# Patient Record
Sex: FEMALE | Race: BLACK OR AFRICAN AMERICAN | NOT HISPANIC OR LATINO | Employment: FULL TIME | URBAN - METROPOLITAN AREA
[De-identification: names, ages, dates, MRNs, and addresses within clinical notes are randomized per-mention and may not be internally consistent; named-entity substitution may affect disease eponyms.]

---

## 2021-05-26 PROBLEM — E66.01 MORBID OBESITY WITH BMI OF 70 AND OVER, ADULT: Status: ACTIVE | Noted: 2021-05-26

## 2021-05-26 PROBLEM — I10 ESSENTIAL HYPERTENSION: Status: ACTIVE | Noted: 2021-05-26

## 2021-05-31 ENCOUNTER — TELEPHONE (OUTPATIENT)
Dept: BARIATRICS | Facility: CLINIC | Age: 26
End: 2021-05-31

## 2021-06-23 ENCOUNTER — OFFICE VISIT (OUTPATIENT)
Dept: OBSTETRICS AND GYNECOLOGY | Facility: CLINIC | Age: 26
End: 2021-06-23
Payer: COMMERCIAL

## 2021-06-23 VITALS
HEIGHT: 68 IN | DIASTOLIC BLOOD PRESSURE: 82 MMHG | BODY MASS INDEX: 44.41 KG/M2 | SYSTOLIC BLOOD PRESSURE: 124 MMHG | WEIGHT: 293 LBS

## 2021-06-23 DIAGNOSIS — N92.6 IRREGULAR PERIODS: ICD-10-CM

## 2021-06-23 DIAGNOSIS — I10 ESSENTIAL HYPERTENSION: ICD-10-CM

## 2021-06-23 DIAGNOSIS — E66.01 MORBID OBESITY WITH BMI OF 70 AND OVER, ADULT: ICD-10-CM

## 2021-06-23 DIAGNOSIS — Z01.419 ROUTINE GYNECOLOGICAL EXAMINATION: Primary | ICD-10-CM

## 2021-06-23 PROCEDURE — 3008F PR BODY MASS INDEX (BMI) DOCUMENTED: ICD-10-PCS | Mod: CPTII,S$GLB,, | Performed by: OBSTETRICS & GYNECOLOGY

## 2021-06-23 PROCEDURE — 99385 PR PREVENTIVE VISIT,NEW,18-39: ICD-10-PCS | Mod: S$GLB,,, | Performed by: OBSTETRICS & GYNECOLOGY

## 2021-06-23 PROCEDURE — 1126F PR PAIN SEVERITY QUANTIFIED, NO PAIN PRESENT: ICD-10-PCS | Mod: S$GLB,,, | Performed by: OBSTETRICS & GYNECOLOGY

## 2021-06-23 PROCEDURE — 3074F SYST BP LT 130 MM HG: CPT | Mod: CPTII,S$GLB,, | Performed by: OBSTETRICS & GYNECOLOGY

## 2021-06-23 PROCEDURE — 99385 PREV VISIT NEW AGE 18-39: CPT | Mod: S$GLB,,, | Performed by: OBSTETRICS & GYNECOLOGY

## 2021-06-23 PROCEDURE — 1126F AMNT PAIN NOTED NONE PRSNT: CPT | Mod: S$GLB,,, | Performed by: OBSTETRICS & GYNECOLOGY

## 2021-06-23 PROCEDURE — 3008F BODY MASS INDEX DOCD: CPT | Mod: CPTII,S$GLB,, | Performed by: OBSTETRICS & GYNECOLOGY

## 2021-06-23 PROCEDURE — 3074F PR MOST RECENT SYSTOLIC BLOOD PRESSURE < 130 MM HG: ICD-10-PCS | Mod: CPTII,S$GLB,, | Performed by: OBSTETRICS & GYNECOLOGY

## 2021-06-23 PROCEDURE — 99999 PR PBB SHADOW E&M-EST. PATIENT-LVL III: CPT | Mod: PBBFAC,,, | Performed by: OBSTETRICS & GYNECOLOGY

## 2021-06-23 PROCEDURE — 87624 HPV HI-RISK TYP POOLED RSLT: CPT | Performed by: OBSTETRICS & GYNECOLOGY

## 2021-06-23 PROCEDURE — 88175 CYTOPATH C/V AUTO FLUID REDO: CPT | Performed by: OBSTETRICS & GYNECOLOGY

## 2021-06-23 PROCEDURE — 3079F PR MOST RECENT DIASTOLIC BLOOD PRESSURE 80-89 MM HG: ICD-10-PCS | Mod: CPTII,S$GLB,, | Performed by: OBSTETRICS & GYNECOLOGY

## 2021-06-23 PROCEDURE — 3079F DIAST BP 80-89 MM HG: CPT | Mod: CPTII,S$GLB,, | Performed by: OBSTETRICS & GYNECOLOGY

## 2021-06-23 PROCEDURE — 99999 PR PBB SHADOW E&M-EST. PATIENT-LVL III: ICD-10-PCS | Mod: PBBFAC,,, | Performed by: OBSTETRICS & GYNECOLOGY

## 2021-06-29 LAB
HPV HR 12 DNA SPEC QL NAA+PROBE: NEGATIVE
HPV16 AG SPEC QL: NEGATIVE
HPV18 DNA SPEC QL NAA+PROBE: NEGATIVE

## 2021-06-30 LAB
FINAL PATHOLOGIC DIAGNOSIS: NORMAL
Lab: NORMAL

## 2021-07-30 ENCOUNTER — IMMUNIZATION (OUTPATIENT)
Dept: FAMILY MEDICINE | Facility: CLINIC | Age: 26
End: 2021-07-30
Payer: MEDICAID

## 2021-07-30 DIAGNOSIS — Z23 NEED FOR VACCINATION: Primary | ICD-10-CM

## 2021-07-30 PROCEDURE — 91300 COVID-19, MRNA, LNP-S, PF, 30 MCG/0.3 ML DOSE VACCINE: CPT | Mod: PBBFAC,PO

## 2021-08-23 ENCOUNTER — IMMUNIZATION (OUTPATIENT)
Dept: FAMILY MEDICINE | Facility: CLINIC | Age: 26
End: 2021-08-23
Payer: MEDICAID

## 2021-08-23 DIAGNOSIS — Z23 NEED FOR VACCINATION: Primary | ICD-10-CM

## 2021-08-23 PROCEDURE — 0002A COVID-19, MRNA, LNP-S, PF, 30 MCG/0.3 ML DOSE VACCINE: ICD-10-PCS | Mod: CV19,,, | Performed by: FAMILY MEDICINE

## 2021-08-23 PROCEDURE — 0002A COVID-19, MRNA, LNP-S, PF, 30 MCG/0.3 ML DOSE VACCINE: CPT | Mod: CV19,,, | Performed by: FAMILY MEDICINE

## 2021-08-23 PROCEDURE — 91300 COVID-19, MRNA, LNP-S, PF, 30 MCG/0.3 ML DOSE VACCINE: ICD-10-PCS | Mod: ,,, | Performed by: FAMILY MEDICINE

## 2021-08-23 PROCEDURE — 91300 COVID-19, MRNA, LNP-S, PF, 30 MCG/0.3 ML DOSE VACCINE: CPT | Mod: ,,, | Performed by: FAMILY MEDICINE

## 2022-01-24 ENCOUNTER — OFFICE VISIT (OUTPATIENT)
Dept: OBSTETRICS AND GYNECOLOGY | Facility: CLINIC | Age: 27
End: 2022-01-24
Payer: COMMERCIAL

## 2022-01-24 VITALS
HEIGHT: 68 IN | BODY MASS INDEX: 44.41 KG/M2 | DIASTOLIC BLOOD PRESSURE: 82 MMHG | WEIGHT: 293 LBS | SYSTOLIC BLOOD PRESSURE: 138 MMHG

## 2022-01-24 DIAGNOSIS — I10 ESSENTIAL HYPERTENSION: ICD-10-CM

## 2022-01-24 DIAGNOSIS — E66.01 MORBID OBESITY WITH BMI OF 70 AND OVER, ADULT: ICD-10-CM

## 2022-01-24 DIAGNOSIS — L68.9 EXCESSIVE HAIR GROWTH: ICD-10-CM

## 2022-01-24 DIAGNOSIS — N92.6 IRREGULAR PERIODS: Primary | ICD-10-CM

## 2022-01-24 PROCEDURE — 99999 PR PBB SHADOW E&M-EST. PATIENT-LVL III: CPT | Mod: PBBFAC,,, | Performed by: OBSTETRICS & GYNECOLOGY

## 2022-01-24 PROCEDURE — 3008F PR BODY MASS INDEX (BMI) DOCUMENTED: ICD-10-PCS | Mod: CPTII,,, | Performed by: OBSTETRICS & GYNECOLOGY

## 2022-01-24 PROCEDURE — 1159F MED LIST DOCD IN RCRD: CPT | Mod: CPTII,,, | Performed by: OBSTETRICS & GYNECOLOGY

## 2022-01-24 PROCEDURE — 3075F SYST BP GE 130 - 139MM HG: CPT | Mod: CPTII,,, | Performed by: OBSTETRICS & GYNECOLOGY

## 2022-01-24 PROCEDURE — 99213 OFFICE O/P EST LOW 20 MIN: CPT | Mod: PBBFAC,PN | Performed by: OBSTETRICS & GYNECOLOGY

## 2022-01-24 PROCEDURE — 3075F PR MOST RECENT SYSTOLIC BLOOD PRESS GE 130-139MM HG: ICD-10-PCS | Mod: CPTII,,, | Performed by: OBSTETRICS & GYNECOLOGY

## 2022-01-24 PROCEDURE — 3079F DIAST BP 80-89 MM HG: CPT | Mod: CPTII,,, | Performed by: OBSTETRICS & GYNECOLOGY

## 2022-01-24 PROCEDURE — 99214 OFFICE O/P EST MOD 30 MIN: CPT | Mod: S$PBB,,, | Performed by: OBSTETRICS & GYNECOLOGY

## 2022-01-24 PROCEDURE — 99214 PR OFFICE/OUTPT VISIT, EST, LEVL IV, 30-39 MIN: ICD-10-PCS | Mod: S$PBB,,, | Performed by: OBSTETRICS & GYNECOLOGY

## 2022-01-24 PROCEDURE — 99999 PR PBB SHADOW E&M-EST. PATIENT-LVL III: ICD-10-PCS | Mod: PBBFAC,,, | Performed by: OBSTETRICS & GYNECOLOGY

## 2022-01-24 PROCEDURE — 3079F PR MOST RECENT DIASTOLIC BLOOD PRESSURE 80-89 MM HG: ICD-10-PCS | Mod: CPTII,,, | Performed by: OBSTETRICS & GYNECOLOGY

## 2022-01-24 PROCEDURE — 3008F BODY MASS INDEX DOCD: CPT | Mod: CPTII,,, | Performed by: OBSTETRICS & GYNECOLOGY

## 2022-01-24 PROCEDURE — 1159F PR MEDICATION LIST DOCUMENTED IN MEDICAL RECORD: ICD-10-PCS | Mod: CPTII,,, | Performed by: OBSTETRICS & GYNECOLOGY

## 2022-01-24 RX ORDER — SPIRONOLACTONE 25 MG/1
25 TABLET ORAL DAILY
Qty: 30 TABLET | Refills: 11 | Status: SHIPPED | OUTPATIENT
Start: 2022-01-24 | End: 2022-12-02 | Stop reason: SDUPTHER

## 2022-01-24 NOTE — PROGRESS NOTES
Chief Complaint   Patient presents with    Follow-up     Birth control follow up       History of Present Illness: Juanita Del Castillo is a 26 y.o. female that presents today 2022 for   Chief Complaint   Patient presents with    Follow-up     Birth control follow up     Reports 10 lbs weight loss, bothered more by her hair growth on her chin,  Have regular cycles on pills.  BP controlled now on 2 BP meds.     Past Medical History:   Diagnosis Date    HTN (hypertension)        History reviewed. No pertinent surgical history.    Current Outpatient Medications   Medication Sig Dispense Refill    ascorbic acid, vitamin C, (VITAMIN C) 1000 MG tablet Take 1,000 mg by mouth once daily.      b complex vitamins capsule Take 1 capsule by mouth once daily.      hydroCHLOROthiazide (HYDRODIURIL) 25 MG tablet Take 1 tablet (25 mg total) by mouth once daily. 90 tablet 1    losartan (COZAAR) 50 MG tablet Take 1 tablet (50 mg total) by mouth once daily. 90 tablet 1    norgestrel-ethinyl estradioL (LO/OVRAL) 0.3-30 mg-mcg per tablet Take 1 tablet by mouth once daily. 28 tablet 11    spironolactone (ALDACTONE) 25 MG tablet Take 1 tablet (25 mg total) by mouth once daily. 30 tablet 11    triamcinolone acetonide 0.1% (KENALOG) 0.1 % cream Apply topically 2 (two) times daily. 45 g 0     No current facility-administered medications for this visit.       Review of patient's allergies indicates:   Allergen Reactions    Lisinopril      Lip swelling       Family History   Problem Relation Age of Onset    Hypertension Mother     Breast cancer Paternal Grandmother     Ovarian cancer Neg Hx        Social History     Tobacco Use    Smoking status: Never Smoker    Smokeless tobacco: Never Used   Substance Use Topics    Alcohol use: Yes     Comment: once every 3 months    Drug use: Never       OB History    Para Term  AB Living   0 0 0 0 0 0   SAB IAB Ectopic Multiple Live Births   0 0 0 0 0         /82    "Ht 5' 8" (1.727 m)   Wt (!) 222 kg (489 lb 6.7 oz)   LMP 01/18/2022 (Exact Date)       ASSESSMENT/PLAN:  Irregular periods  Comments:  improved on OCPs  recommended mirena in light of her CHTN and obesity  Orders:  -     Device Authorization Order  -     norgestrel-ethinyl estradioL (LO/OVRAL) 0.3-30 mg-mcg per tablet; Take 1 tablet by mouth once daily.  Dispense: 28 tablet; Refill: 11    Essential hypertension    Morbid obesity with BMI of 70 and over, adult    Excessive hair growth  -     spironolactone (ALDACTONE) 25 MG tablet; Take 1 tablet (25 mg total) by mouth once daily.  Dispense: 30 tablet; Refill: 11      We discussed her weight and she says she is working on it.     Pap due 6/22    30 minutes spent today spent preparing reviewing previous external notes, reviewing previous results, performing medical examination, ordering tests or medications, counseling and documenting.     "

## 2022-04-19 ENCOUNTER — PATIENT MESSAGE (OUTPATIENT)
Dept: OBSTETRICS AND GYNECOLOGY | Facility: CLINIC | Age: 27
End: 2022-04-19
Payer: COMMERCIAL

## 2022-04-26 ENCOUNTER — OFFICE VISIT (OUTPATIENT)
Dept: OBSTETRICS AND GYNECOLOGY | Facility: CLINIC | Age: 27
End: 2022-04-26
Payer: COMMERCIAL

## 2022-04-26 VITALS — BODY MASS INDEX: 44.41 KG/M2 | WEIGHT: 293 LBS | HEIGHT: 68 IN

## 2022-04-26 DIAGNOSIS — N92.6 IRREGULAR PERIODS: ICD-10-CM

## 2022-04-26 DIAGNOSIS — Z30.430 ENCOUNTER FOR IUD INSERTION: Primary | ICD-10-CM

## 2022-04-26 PROCEDURE — 1159F MED LIST DOCD IN RCRD: CPT | Mod: CPTII,S$GLB,, | Performed by: OBSTETRICS & GYNECOLOGY

## 2022-04-26 PROCEDURE — 87591 N.GONORRHOEAE DNA AMP PROB: CPT | Performed by: OBSTETRICS & GYNECOLOGY

## 2022-04-26 PROCEDURE — 99999 PR PBB SHADOW E&M-EST. PATIENT-LVL III: CPT | Mod: PBBFAC,,, | Performed by: OBSTETRICS & GYNECOLOGY

## 2022-04-26 PROCEDURE — 88305 TISSUE EXAM BY PATHOLOGIST: CPT | Performed by: PATHOLOGY

## 2022-04-26 PROCEDURE — 58100 BIOPSY OF UTERUS LINING: CPT | Mod: PBBFAC,PN | Performed by: OBSTETRICS & GYNECOLOGY

## 2022-04-26 PROCEDURE — 88305 TISSUE EXAM BY PATHOLOGIST: CPT | Mod: 26,,, | Performed by: PATHOLOGY

## 2022-04-26 PROCEDURE — 58300 PR INSERT INTRAUTERINE DEVICE: ICD-10-PCS | Mod: S$GLB,,, | Performed by: OBSTETRICS & GYNECOLOGY

## 2022-04-26 PROCEDURE — 58300 INSERT INTRAUTERINE DEVICE: CPT | Mod: PBBFAC,PN | Performed by: OBSTETRICS & GYNECOLOGY

## 2022-04-26 PROCEDURE — 99499 UNLISTED E&M SERVICE: CPT | Mod: S$GLB,,, | Performed by: OBSTETRICS & GYNECOLOGY

## 2022-04-26 PROCEDURE — 99999 PR PBB SHADOW E&M-EST. PATIENT-LVL III: ICD-10-PCS | Mod: PBBFAC,,, | Performed by: OBSTETRICS & GYNECOLOGY

## 2022-04-26 PROCEDURE — 1159F PR MEDICATION LIST DOCUMENTED IN MEDICAL RECORD: ICD-10-PCS | Mod: CPTII,S$GLB,, | Performed by: OBSTETRICS & GYNECOLOGY

## 2022-04-26 PROCEDURE — 58100 PR BIOPSY OF UTERUS LINING: ICD-10-PCS | Mod: S$GLB,,, | Performed by: OBSTETRICS & GYNECOLOGY

## 2022-04-26 PROCEDURE — 58100 BIOPSY OF UTERUS LINING: CPT | Mod: S$GLB,,, | Performed by: OBSTETRICS & GYNECOLOGY

## 2022-04-26 PROCEDURE — 87491 CHLMYD TRACH DNA AMP PROBE: CPT | Performed by: OBSTETRICS & GYNECOLOGY

## 2022-04-26 PROCEDURE — 3008F PR BODY MASS INDEX (BMI) DOCUMENTED: ICD-10-PCS | Mod: CPTII,S$GLB,, | Performed by: OBSTETRICS & GYNECOLOGY

## 2022-04-26 PROCEDURE — 58300 INSERT INTRAUTERINE DEVICE: CPT | Mod: S$GLB,,, | Performed by: OBSTETRICS & GYNECOLOGY

## 2022-04-26 PROCEDURE — 88305 TISSUE EXAM BY PATHOLOGIST: ICD-10-PCS | Mod: 26,,, | Performed by: PATHOLOGY

## 2022-04-26 PROCEDURE — 99213 OFFICE O/P EST LOW 20 MIN: CPT | Mod: PBBFAC,PN,25 | Performed by: OBSTETRICS & GYNECOLOGY

## 2022-04-26 PROCEDURE — 99499 NO LOS: ICD-10-PCS | Mod: S$GLB,,, | Performed by: OBSTETRICS & GYNECOLOGY

## 2022-04-26 PROCEDURE — 3008F BODY MASS INDEX DOCD: CPT | Mod: CPTII,S$GLB,, | Performed by: OBSTETRICS & GYNECOLOGY

## 2022-04-26 RX ADMIN — LEVONORGESTREL 1 INTRA UTERINE DEVICE: 52 INTRAUTERINE DEVICE INTRAUTERINE at 03:04

## 2022-04-26 NOTE — PROGRESS NOTES
ENDOMETRIAL BIOPSY:    Female patient presents for an endometrial biopsy due to abnormal bleeding.      UPT is negative.    PRE ENDOMETRIAL BIOPSY COUNSELING:  The patient was informed of the risk of bleeding, infection, uterine perforation and pain and that the test will rule-out endometrial cancer with accuracy greater than 95%. She was counseled on the alternatives to endometrial biopsy and agrees to proceed.    PROCEDURE:  TIME OUT PERFORMED.  The cervix was visualized with a speculum.  A single tooth tenaculum was placed on the anterior lip prior to the biopsy.  A sterile endometrial pipelle was passed without difficulty to a depth of 8 cm.  Adequate endometrial tissue was obtained.    The specimen was placed in formalyn and sent to Pathology for histology evaluation.  The patient tolerated the procedure well.    ASSESSMENT:   Abnormal uterine bleeding  626.8.    POST ENDOMETRIAL BIOPSY COUNSELING:  Manage post biopsy cramping with NSAIDs or Tylenol.  Expect spotting or light bleeding for a few days.  Report bleeding heavier than a period, fever > 101.0 F, worsening pain or a foul smelling vaginal discharge.    Counseling lasted approximately 15 minutes and all her questions were answered.    FOLLOW-UP: Pending biopsy results.    TIMEOUT PERFORMED  Patient identified, device confirmed, and allergies reviewed.     IUD PLACEMENT:    Female patient  presents for an IUD placement.    negative UPT    PRE-IUD PLACEMENT COUNSELING:  All contraceptive options were reviewed and the patient chooses an IUD.  The patient's history was reviewed and there are no contraindications to an IUD. The procedure and minimal risks of pain, bleeding, perforation and infection at the insertion and spontaneous expulsion within the first two weeks was discussed. The benefits of amenorrhea and no systemic side effects were explained. All questions were answered and the patient agrees to proceed. Consent was signed (scanned into  computer).    EXAM:  Uterine Position: AV    PROCEDURE:  TIME OUT PERFORMED.  The cervix visualized with a speculum.  A single tooth tenaculum placed on the anterior lip.  The uterus sounded to 10 cm using sterile technique.    The Mirena     IUD was loaded and placed high in uterine fundus without difficulty using sterile technique.  The string was cut to 2cm length from exo cervix.  The tenaculum and speculum were removed. The patient tolerated the procedure well.    ASSESSMENT:  1. Contraception management / IUD insertion.V25.0.    POST IUD PLACEMENT COUNSELING:  Manage post IUD placement pain with NSAIDs, Tylenol or Rx per [employer].  IUD danger signs and how to check the strings.  Removal in 5 years for Mirena IUD and in 10 years for Copper IUD.    Counseling lasted approximately 15 minutes and all her questions were answered.    FOLLOW-UP: With me in 6 weeks.

## 2022-04-27 LAB
C TRACH DNA SPEC QL NAA+PROBE: NOT DETECTED
N GONORRHOEA DNA SPEC QL NAA+PROBE: NOT DETECTED

## 2022-05-03 LAB
FINAL PATHOLOGIC DIAGNOSIS: NORMAL
Lab: NORMAL

## 2022-05-30 ENCOUNTER — PATIENT MESSAGE (OUTPATIENT)
Dept: OBSTETRICS AND GYNECOLOGY | Facility: CLINIC | Age: 27
End: 2022-05-30
Payer: COMMERCIAL

## 2022-07-01 ENCOUNTER — OFFICE VISIT (OUTPATIENT)
Dept: OBSTETRICS AND GYNECOLOGY | Facility: CLINIC | Age: 27
End: 2022-07-01
Payer: COMMERCIAL

## 2022-07-01 VITALS
WEIGHT: 293 LBS | HEIGHT: 68 IN | SYSTOLIC BLOOD PRESSURE: 130 MMHG | BODY MASS INDEX: 44.41 KG/M2 | DIASTOLIC BLOOD PRESSURE: 82 MMHG

## 2022-07-01 DIAGNOSIS — Z30.431 IUD CHECK UP: Primary | ICD-10-CM

## 2022-07-01 PROCEDURE — 99999 PR PBB SHADOW E&M-EST. PATIENT-LVL III: CPT | Mod: PBBFAC,,, | Performed by: OBSTETRICS & GYNECOLOGY

## 2022-07-01 PROCEDURE — 3075F SYST BP GE 130 - 139MM HG: CPT | Mod: CPTII,S$GLB,, | Performed by: OBSTETRICS & GYNECOLOGY

## 2022-07-01 PROCEDURE — 1159F MED LIST DOCD IN RCRD: CPT | Mod: CPTII,S$GLB,, | Performed by: OBSTETRICS & GYNECOLOGY

## 2022-07-01 PROCEDURE — 3075F PR MOST RECENT SYSTOLIC BLOOD PRESS GE 130-139MM HG: ICD-10-PCS | Mod: CPTII,S$GLB,, | Performed by: OBSTETRICS & GYNECOLOGY

## 2022-07-01 PROCEDURE — 4010F PR ACE/ARB THEARPY RXD/TAKEN: ICD-10-PCS | Mod: CPTII,S$GLB,, | Performed by: OBSTETRICS & GYNECOLOGY

## 2022-07-01 PROCEDURE — 99999 PR PBB SHADOW E&M-EST. PATIENT-LVL III: ICD-10-PCS | Mod: PBBFAC,,, | Performed by: OBSTETRICS & GYNECOLOGY

## 2022-07-01 PROCEDURE — 3008F PR BODY MASS INDEX (BMI) DOCUMENTED: ICD-10-PCS | Mod: CPTII,S$GLB,, | Performed by: OBSTETRICS & GYNECOLOGY

## 2022-07-01 PROCEDURE — 99499 UNLISTED E&M SERVICE: CPT | Mod: S$GLB,,, | Performed by: OBSTETRICS & GYNECOLOGY

## 2022-07-01 PROCEDURE — 3079F DIAST BP 80-89 MM HG: CPT | Mod: CPTII,S$GLB,, | Performed by: OBSTETRICS & GYNECOLOGY

## 2022-07-01 PROCEDURE — 1159F PR MEDICATION LIST DOCUMENTED IN MEDICAL RECORD: ICD-10-PCS | Mod: CPTII,S$GLB,, | Performed by: OBSTETRICS & GYNECOLOGY

## 2022-07-01 PROCEDURE — 4010F ACE/ARB THERAPY RXD/TAKEN: CPT | Mod: CPTII,S$GLB,, | Performed by: OBSTETRICS & GYNECOLOGY

## 2022-07-01 PROCEDURE — 99499 NO LOS: ICD-10-PCS | Mod: S$GLB,,, | Performed by: OBSTETRICS & GYNECOLOGY

## 2022-07-01 PROCEDURE — 3008F BODY MASS INDEX DOCD: CPT | Mod: CPTII,S$GLB,, | Performed by: OBSTETRICS & GYNECOLOGY

## 2022-07-01 PROCEDURE — 3079F PR MOST RECENT DIASTOLIC BLOOD PRESSURE 80-89 MM HG: ICD-10-PCS | Mod: CPTII,S$GLB,, | Performed by: OBSTETRICS & GYNECOLOGY

## 2022-08-01 ENCOUNTER — PATIENT MESSAGE (OUTPATIENT)
Dept: OBSTETRICS AND GYNECOLOGY | Facility: CLINIC | Age: 27
End: 2022-08-01
Payer: COMMERCIAL

## 2022-08-01 DIAGNOSIS — N92.6 IRREGULAR PERIODS: ICD-10-CM

## 2022-09-19 ENCOUNTER — OFFICE VISIT (OUTPATIENT)
Dept: OBSTETRICS AND GYNECOLOGY | Facility: CLINIC | Age: 27
End: 2022-09-19
Payer: COMMERCIAL

## 2022-09-19 VITALS
DIASTOLIC BLOOD PRESSURE: 84 MMHG | BODY MASS INDEX: 44.41 KG/M2 | SYSTOLIC BLOOD PRESSURE: 124 MMHG | HEIGHT: 68 IN | WEIGHT: 293 LBS

## 2022-09-19 DIAGNOSIS — L29.2 VULVAR ITCHING: ICD-10-CM

## 2022-09-19 DIAGNOSIS — Z01.419 GYNECOLOGIC EXAM NORMAL: Primary | ICD-10-CM

## 2022-09-19 DIAGNOSIS — Z30.431 IUD CHECK UP: ICD-10-CM

## 2022-09-19 PROCEDURE — 99999 PR PBB SHADOW E&M-EST. PATIENT-LVL III: ICD-10-PCS | Mod: PBBFAC,,, | Performed by: OBSTETRICS & GYNECOLOGY

## 2022-09-19 PROCEDURE — 3008F PR BODY MASS INDEX (BMI) DOCUMENTED: ICD-10-PCS | Mod: CPTII,S$GLB,, | Performed by: OBSTETRICS & GYNECOLOGY

## 2022-09-19 PROCEDURE — 3074F PR MOST RECENT SYSTOLIC BLOOD PRESSURE < 130 MM HG: ICD-10-PCS | Mod: CPTII,S$GLB,, | Performed by: OBSTETRICS & GYNECOLOGY

## 2022-09-19 PROCEDURE — 99395 PR PREVENTIVE VISIT,EST,18-39: ICD-10-PCS | Mod: S$GLB,,, | Performed by: OBSTETRICS & GYNECOLOGY

## 2022-09-19 PROCEDURE — 1159F MED LIST DOCD IN RCRD: CPT | Mod: CPTII,S$GLB,, | Performed by: OBSTETRICS & GYNECOLOGY

## 2022-09-19 PROCEDURE — 4010F ACE/ARB THERAPY RXD/TAKEN: CPT | Mod: CPTII,S$GLB,, | Performed by: OBSTETRICS & GYNECOLOGY

## 2022-09-19 PROCEDURE — 3008F BODY MASS INDEX DOCD: CPT | Mod: CPTII,S$GLB,, | Performed by: OBSTETRICS & GYNECOLOGY

## 2022-09-19 PROCEDURE — 1159F PR MEDICATION LIST DOCUMENTED IN MEDICAL RECORD: ICD-10-PCS | Mod: CPTII,S$GLB,, | Performed by: OBSTETRICS & GYNECOLOGY

## 2022-09-19 PROCEDURE — 3074F SYST BP LT 130 MM HG: CPT | Mod: CPTII,S$GLB,, | Performed by: OBSTETRICS & GYNECOLOGY

## 2022-09-19 PROCEDURE — 99999 PR PBB SHADOW E&M-EST. PATIENT-LVL III: CPT | Mod: PBBFAC,,, | Performed by: OBSTETRICS & GYNECOLOGY

## 2022-09-19 PROCEDURE — 4010F PR ACE/ARB THEARPY RXD/TAKEN: ICD-10-PCS | Mod: CPTII,S$GLB,, | Performed by: OBSTETRICS & GYNECOLOGY

## 2022-09-19 PROCEDURE — 3079F DIAST BP 80-89 MM HG: CPT | Mod: CPTII,S$GLB,, | Performed by: OBSTETRICS & GYNECOLOGY

## 2022-09-19 PROCEDURE — 88175 CYTOPATH C/V AUTO FLUID REDO: CPT | Performed by: OBSTETRICS & GYNECOLOGY

## 2022-09-19 PROCEDURE — 99395 PREV VISIT EST AGE 18-39: CPT | Mod: S$GLB,,, | Performed by: OBSTETRICS & GYNECOLOGY

## 2022-09-19 PROCEDURE — 3079F PR MOST RECENT DIASTOLIC BLOOD PRESSURE 80-89 MM HG: ICD-10-PCS | Mod: CPTII,S$GLB,, | Performed by: OBSTETRICS & GYNECOLOGY

## 2022-09-19 RX ORDER — NYSTATIN AND TRIAMCINOLONE ACETONIDE 100000; 1 [USP'U]/G; MG/G
CREAM TOPICAL
Qty: 30 G | Refills: 1 | Status: SHIPPED | OUTPATIENT
Start: 2022-09-19 | End: 2023-09-14

## 2022-09-19 NOTE — PROGRESS NOTES
Chief Complaint   Patient presents with    Well Woman       History of Present Illness: Juanita Del Castillo is a 26 y.o. female that presents today 9/19/2022 for well gyn visit.    Past Medical History:   Diagnosis Date    HTN (hypertension)        No past surgical history on file.    Current Outpatient Medications   Medication Sig Dispense Refill    ascorbic acid, vitamin C, (VITAMIN C) 1000 MG tablet Take 1,000 mg by mouth once daily.      b complex vitamins capsule Take 1 capsule by mouth once daily.      hydroCHLOROthiazide (HYDRODIURIL) 25 MG tablet Take 1 tablet (25 mg total) by mouth once daily. Needs office visit for further refills 30 tablet 0    losartan (COZAAR) 50 MG tablet Take 1 tablet (50 mg total) by mouth once daily. Needs office visit for further refills 30 tablet 0    triamcinolone acetonide 0.1% (KENALOG) 0.1 % cream Apply topically 2 (two) times daily. 45 g 0    albuterol (PROAIR HFA) 90 mcg/actuation inhaler Inhale 2 puffs into the lungs every 6 (six) hours as needed for Shortness of Breath. Rescue (Patient not taking: Reported on 9/19/2022) 18 g 0    benzonatate (TESSALON PERLES) 100 MG capsule Take 1 capsule (100 mg total) by mouth 3 (three) times daily as needed for Cough. 30 capsule 1    norgestrel-ethinyl estradioL (LO/OVRAL) 0.3-30 mg-mcg per tablet Take 1 tablet by mouth once daily. 28 tablet 11    nystatin-triamcinolone (MYCOLOG II) cream Apply to affected area 2 times daily 30 g 1    promethazine (PHENERGAN) 6.25 mg/5 mL syrup Take 20 mLs (25 mg total) by mouth nightly as needed for Nausea. 118 mL 0    spironolactone (ALDACTONE) 25 MG tablet Take 1 tablet (25 mg total) by mouth once daily. 30 tablet 11     No current facility-administered medications for this visit.       Review of patient's allergies indicates:   Allergen Reactions    Lisinopril      Lip swelling       Family History   Problem Relation Age of Onset    Hypertension Mother     Breast cancer Paternal Grandmother      "Ovarian cancer Neg Hx        Social History     Socioeconomic History    Marital status: Single   Tobacco Use    Smoking status: Never    Smokeless tobacco: Never   Substance and Sexual Activity    Alcohol use: Yes     Comment: once every 3 months    Drug use: Never    Sexual activity: Not Currently     Partners: Male       OB History    Para Term  AB Living   0 0 0 0 0 0   SAB IAB Ectopic Multiple Live Births   0 0 0 0 0       Review of Symptoms:  GENERAL: Denies weight gain or weight loss. Feeling well overall.   SKIN: Denies rash or lesions.   HEAD: Denies head injury or headache.   NODES: Denies enlarged lymph nodes.   CHEST: Denies chest pain or shortness of breath.   CARDIOVASCULAR: Denies palpitations or left sided chest pain.   ABDOMEN: No abdominal pain, constipation, diarrhea, nausea, vomiting or rectal bleeding.   URINARY: No frequency, dysuria, hematuria, or burning on urination.  HEMATOLOGIC: No easy bruisability or excessive bleeding.   MUSCULOSKELETAL: Denies joint pain or swelling.     /84   Ht 5' 8" (1.727 m)   Wt (!) 214.6 kg (473 lb 1.7 oz)   LMP 2022   Physical Exam:  APPEARANCE: Well nourished, well developed, in no acute distress.  SKIN: Normal skin turgor, no lesions.  NECK: Neck symmetric without masses   RESPIRATORY: Normal respiratory effort with no retractions or use of accessory muscles  CARDIOVASCULAR: Peripheral vascular system with no swelling no varicosities and palpation of pulses normal  LYMPHATIC: No enlargements of the lymph nodes noted in the neck, axillae, or groin  ABDOMEN: Soft. No tenderness or masses. No hepatosplenomegaly. No hernias.  BREASTS: Symmetrical, no skin changes or visible lesions. No palpable masses, nipple discharge or adenopathy bilaterally.  PELVIC: Normal external female genitalia without lesions. Normal hair distribution. Adequate perineal body, normal urethral meatus. Urethra with no masses.  Bladder nontender. Vagina moist and " well rugated without lesions or discharge. Cervix pink and without lesions. No significant cystocele or rectocele. Bimanual exam showed uterus normal size, shape, position, mobile and nontender. Adnexa without masses or tenderness. Urethra and bladder normal. ++ string in place  EXTREMITIES: No clubbing cyanosis or edema.    ASSESSMENT/PLAN:  Gynecologic exam normal  -     Liquid-Based Pap Smear, Screening    Vulvar itching  -     nystatin-triamcinolone (MYCOLOG II) cream; Apply to affected area 2 times daily  Dispense: 30 g; Refill: 1    IUD check up        Patient was counseled today on Pelvic exams and Pap Smear guidelines.   We discussed STD screening if at high risk for a STD.  We discussed recommendation for breast cancer screening with mammogram every other year after the age of 40 and annually after the age of 50.    We discussed colon cancer screening when indicated.   Osteoporosis screening discussed when indicated.   She was advised to see her primary care physician for all other health maintenance.     FOLLOW-UP with me for next routine visit.

## 2022-09-26 ENCOUNTER — TELEPHONE (OUTPATIENT)
Dept: OBSTETRICS AND GYNECOLOGY | Facility: CLINIC | Age: 27
End: 2022-09-26
Payer: COMMERCIAL

## 2022-09-26 NOTE — TELEPHONE ENCOUNTER
Spoke with pt and informed her results per Dr Evangelista instructions: Repeat pap in 2 months . Pt verbalized understanding.

## 2022-09-26 NOTE — TELEPHONE ENCOUNTER
Spoke with pt and informed her I have her scheduled for 11/28/22 at 10 am. Pt verbalized understanding.

## 2022-09-26 NOTE — TELEPHONE ENCOUNTER
----- Message from Arie Lieberman sent at 9/26/2022  2:12 PM CDT -----  Contact: pt at [phone]  Type: Needs Medical Advice  Who Called:  pt   Best Call Back Number: [phone]    Additional Information: pt called in today to go over test results she had a missed call but it wasn't recorded please call back to advise

## 2022-11-28 ENCOUNTER — OFFICE VISIT (OUTPATIENT)
Dept: OBSTETRICS AND GYNECOLOGY | Facility: CLINIC | Age: 27
End: 2022-11-28
Payer: COMMERCIAL

## 2022-11-28 VITALS — BODY MASS INDEX: 44.41 KG/M2 | HEIGHT: 68 IN | WEIGHT: 293 LBS

## 2022-11-28 DIAGNOSIS — R87.615 PAP SMEAR OF CERVIX UNSATISFACTORY: Primary | ICD-10-CM

## 2022-11-28 PROCEDURE — 99499 NO LOS: ICD-10-PCS | Mod: S$GLB,,, | Performed by: OBSTETRICS & GYNECOLOGY

## 2022-11-28 PROCEDURE — 4010F PR ACE/ARB THEARPY RXD/TAKEN: ICD-10-PCS | Mod: CPTII,S$GLB,, | Performed by: OBSTETRICS & GYNECOLOGY

## 2022-11-28 PROCEDURE — 3008F BODY MASS INDEX DOCD: CPT | Mod: CPTII,S$GLB,, | Performed by: OBSTETRICS & GYNECOLOGY

## 2022-11-28 PROCEDURE — 88175 CYTOPATH C/V AUTO FLUID REDO: CPT | Performed by: PATHOLOGY

## 2022-11-28 PROCEDURE — 88141 PR  CYTOPATH CERV/VAG INTERPRET: ICD-10-PCS | Mod: ,,, | Performed by: PATHOLOGY

## 2022-11-28 PROCEDURE — 88141 CYTOPATH C/V INTERPRET: CPT | Mod: ,,, | Performed by: PATHOLOGY

## 2022-11-28 PROCEDURE — 1159F MED LIST DOCD IN RCRD: CPT | Mod: CPTII,S$GLB,, | Performed by: OBSTETRICS & GYNECOLOGY

## 2022-11-28 PROCEDURE — 3008F PR BODY MASS INDEX (BMI) DOCUMENTED: ICD-10-PCS | Mod: CPTII,S$GLB,, | Performed by: OBSTETRICS & GYNECOLOGY

## 2022-11-28 PROCEDURE — 99499 UNLISTED E&M SERVICE: CPT | Mod: S$GLB,,, | Performed by: OBSTETRICS & GYNECOLOGY

## 2022-11-28 PROCEDURE — 99999 PR PBB SHADOW E&M-EST. PATIENT-LVL III: ICD-10-PCS | Mod: PBBFAC,,, | Performed by: OBSTETRICS & GYNECOLOGY

## 2022-11-28 PROCEDURE — 99999 PR PBB SHADOW E&M-EST. PATIENT-LVL III: CPT | Mod: PBBFAC,,, | Performed by: OBSTETRICS & GYNECOLOGY

## 2022-11-28 PROCEDURE — 4010F ACE/ARB THERAPY RXD/TAKEN: CPT | Mod: CPTII,S$GLB,, | Performed by: OBSTETRICS & GYNECOLOGY

## 2022-11-28 PROCEDURE — 1159F PR MEDICATION LIST DOCUMENTED IN MEDICAL RECORD: ICD-10-PCS | Mod: CPTII,S$GLB,, | Performed by: OBSTETRICS & GYNECOLOGY

## 2022-11-28 NOTE — PROGRESS NOTES
Chief Complaint   Patient presents with    repeat pap       History of Present Illness: Juanita Del Castillo is a 27 y.o. female that presents today 11/28/2022 for   Chief Complaint   Patient presents with    repeat pap         Past Medical History:   Diagnosis Date    HTN (hypertension)        History reviewed. No pertinent surgical history.    Current Outpatient Medications   Medication Sig Dispense Refill    hydroCHLOROthiazide (HYDRODIURIL) 25 MG tablet Take 1 tablet (25 mg total) by mouth once daily. Needs office visit for further refills 30 tablet 0    losartan (COZAAR) 50 MG tablet Take 1 tablet (50 mg total) by mouth once daily. Needs office visit for further refills 30 tablet 0    nystatin-triamcinolone (MYCOLOG II) cream Apply to affected area 2 times daily 30 g 1    spironolactone (ALDACTONE) 25 MG tablet Take 1 tablet (25 mg total) by mouth once daily. 30 tablet 11    triamcinolone acetonide 0.1% (KENALOG) 0.1 % cream Apply topically 2 (two) times daily. 45 g 0    albuterol (PROAIR HFA) 90 mcg/actuation inhaler Inhale 2 puffs into the lungs every 6 (six) hours as needed for Shortness of Breath. Rescue (Patient not taking: Reported on 9/19/2022) 18 g 0    ascorbic acid, vitamin C, (VITAMIN C) 1000 MG tablet Take 1,000 mg by mouth once daily.      b complex vitamins capsule Take 1 capsule by mouth once daily.      benzonatate (TESSALON PERLES) 100 MG capsule Take 1 capsule (100 mg total) by mouth 3 (three) times daily as needed for Cough. 30 capsule 0    norgestrel-ethinyl estradioL (LO/OVRAL) 0.3-30 mg-mcg per tablet Take 1 tablet by mouth once daily. 28 tablet 11    promethazine (PHENERGAN) 6.25 mg/5 mL syrup Take 5 mLs (6.25 mg total) by mouth nightly as needed (cough). 75 mL 0     No current facility-administered medications for this visit.       Review of patient's allergies indicates:   Allergen Reactions    Lisinopril      Lip swelling       Family History   Problem Relation Age of Onset    Hypertension  "Mother     Breast cancer Paternal Grandmother     Ovarian cancer Neg Hx        Social History     Tobacco Use    Smoking status: Never    Smokeless tobacco: Never   Substance Use Topics    Alcohol use: Yes     Comment: once every 3 months    Drug use: Never       OB History    Para Term  AB Living   0 0 0 0 0 0   SAB IAB Ectopic Multiple Live Births   0 0 0 0 0         Ht 5' 8" (1.727 m)   Wt (!) 227.5 kg (501 lb 8.7 oz)   Physical Exam:  APPEARANCE: Well nourished, well developed, in no acute distress.  SKIN: Normal skin turgor, no lesions.  NECK: Neck symmetric without masses   RESPIRATORY: Normal respiratory effort with no retractions or use of accessory muscles  CARDIOVASCULAR: Peripheral vascular system with no swelling no varicosities and palpation of pulses normal  LYMPHATIC: No enlargements of the lymph nodes noted in the neck, axillae, or groin  ABDOMEN: Soft. No tenderness or masses. No hepatosplenomegaly. No hernias.  PELVIC: Normal external female genitalia without lesions. Normal hair distribution. Adequate perineal body, normal urethral meatus. Urethra with no masses.  Bladder nontender. Vagina moist and well rugated without lesions or discharge. Cervix pink and without lesions. No significant cystocele or rectocele. Bimanual exam showed uterus normal size, shape, position, mobile and nontender. Adnexa without masses or tenderness. Urethra and bladder normal.  EXTREMITIES: No clubbing cyanosis or edema.    ASSESSMENT/PLAN:  Pap smear of cervix unsatisfactory  -     Liquid-Based Pap Smear, Screening              "

## 2022-12-06 ENCOUNTER — TELEPHONE (OUTPATIENT)
Dept: OBSTETRICS AND GYNECOLOGY | Facility: CLINIC | Age: 27
End: 2022-12-06
Payer: COMMERCIAL

## 2022-12-06 LAB
FINAL PATHOLOGIC DIAGNOSIS: ABNORMAL
Lab: ABNORMAL

## 2022-12-06 RX ORDER — METRONIDAZOLE 500 MG/1
2000 TABLET ORAL ONCE
Qty: 4 TABLET | Refills: 0 | Status: SHIPPED | OUTPATIENT
Start: 2022-12-06 | End: 2022-12-06

## 2022-12-06 NOTE — TELEPHONE ENCOUNTER
----- Message from Doris Wallace sent at 12/6/2022 12:19 PM CST -----  Type:  Test Results    Who Called:  pt  Name of Test (Lab/Mammo/Etc):  Pap results  Date of Test:  11/28  Ordering Provider:  Kelsea  Where the test was performed:  [facility]   Best Call Back Number:  [phone] (home)     Additional Information:  please call and advise--thank you

## 2022-12-27 ENCOUNTER — OFFICE VISIT (OUTPATIENT)
Dept: OBSTETRICS AND GYNECOLOGY | Facility: CLINIC | Age: 27
End: 2022-12-27
Payer: COMMERCIAL

## 2022-12-27 VITALS
DIASTOLIC BLOOD PRESSURE: 82 MMHG | HEIGHT: 68 IN | SYSTOLIC BLOOD PRESSURE: 130 MMHG | BODY MASS INDEX: 44.41 KG/M2 | WEIGHT: 293 LBS

## 2022-12-27 DIAGNOSIS — Z11.3 SCREENING FOR STD (SEXUALLY TRANSMITTED DISEASE): Primary | ICD-10-CM

## 2022-12-27 DIAGNOSIS — Z01.411 ENCOUNTER FOR GYNECOLOGICAL EXAMINATION WITH ABNORMAL FINDING: ICD-10-CM

## 2022-12-27 PROCEDURE — 87491 CHLMYD TRACH DNA AMP PROBE: CPT | Performed by: OBSTETRICS & GYNECOLOGY

## 2022-12-27 PROCEDURE — 99999 PR PBB SHADOW E&M-EST. PATIENT-LVL III: CPT | Mod: PBBFAC,,, | Performed by: OBSTETRICS & GYNECOLOGY

## 2022-12-27 PROCEDURE — 4010F PR ACE/ARB THEARPY RXD/TAKEN: ICD-10-PCS | Mod: CPTII,S$GLB,, | Performed by: OBSTETRICS & GYNECOLOGY

## 2022-12-27 PROCEDURE — 99999 PR PBB SHADOW E&M-EST. PATIENT-LVL III: ICD-10-PCS | Mod: PBBFAC,,, | Performed by: OBSTETRICS & GYNECOLOGY

## 2022-12-27 PROCEDURE — 3008F PR BODY MASS INDEX (BMI) DOCUMENTED: ICD-10-PCS | Mod: CPTII,S$GLB,, | Performed by: OBSTETRICS & GYNECOLOGY

## 2022-12-27 PROCEDURE — 99214 OFFICE O/P EST MOD 30 MIN: CPT | Mod: S$GLB,,, | Performed by: OBSTETRICS & GYNECOLOGY

## 2022-12-27 PROCEDURE — 99214 PR OFFICE/OUTPT VISIT, EST, LEVL IV, 30-39 MIN: ICD-10-PCS | Mod: S$GLB,,, | Performed by: OBSTETRICS & GYNECOLOGY

## 2022-12-27 PROCEDURE — 87591 N.GONORRHOEAE DNA AMP PROB: CPT | Performed by: OBSTETRICS & GYNECOLOGY

## 2022-12-27 PROCEDURE — 1159F MED LIST DOCD IN RCRD: CPT | Mod: CPTII,S$GLB,, | Performed by: OBSTETRICS & GYNECOLOGY

## 2022-12-27 PROCEDURE — 3075F PR MOST RECENT SYSTOLIC BLOOD PRESS GE 130-139MM HG: ICD-10-PCS | Mod: CPTII,S$GLB,, | Performed by: OBSTETRICS & GYNECOLOGY

## 2022-12-27 PROCEDURE — 88175 CYTOPATH C/V AUTO FLUID REDO: CPT | Performed by: OBSTETRICS & GYNECOLOGY

## 2022-12-27 PROCEDURE — 3079F PR MOST RECENT DIASTOLIC BLOOD PRESSURE 80-89 MM HG: ICD-10-PCS | Mod: CPTII,S$GLB,, | Performed by: OBSTETRICS & GYNECOLOGY

## 2022-12-27 PROCEDURE — 81514 NFCT DS BV&VAGINITIS DNA ALG: CPT | Performed by: OBSTETRICS & GYNECOLOGY

## 2022-12-27 PROCEDURE — 1159F PR MEDICATION LIST DOCUMENTED IN MEDICAL RECORD: ICD-10-PCS | Mod: CPTII,S$GLB,, | Performed by: OBSTETRICS & GYNECOLOGY

## 2022-12-27 PROCEDURE — 87624 HPV HI-RISK TYP POOLED RSLT: CPT | Performed by: OBSTETRICS & GYNECOLOGY

## 2022-12-27 PROCEDURE — 4010F ACE/ARB THERAPY RXD/TAKEN: CPT | Mod: CPTII,S$GLB,, | Performed by: OBSTETRICS & GYNECOLOGY

## 2022-12-27 PROCEDURE — 3079F DIAST BP 80-89 MM HG: CPT | Mod: CPTII,S$GLB,, | Performed by: OBSTETRICS & GYNECOLOGY

## 2022-12-27 PROCEDURE — 3075F SYST BP GE 130 - 139MM HG: CPT | Mod: CPTII,S$GLB,, | Performed by: OBSTETRICS & GYNECOLOGY

## 2022-12-27 PROCEDURE — 3008F BODY MASS INDEX DOCD: CPT | Mod: CPTII,S$GLB,, | Performed by: OBSTETRICS & GYNECOLOGY

## 2022-12-27 NOTE — PROGRESS NOTES
Chief Complaint   Patient presents with    Repeat PAP     TUNDE       History of Present Illness: Juanita Del Castillo is a 27 y.o. female that presents today 12/27/2022 for   Chief Complaint   Patient presents with    Repeat PAP     TUNDE         Past Medical History:   Diagnosis Date    HTN (hypertension)        History reviewed. No pertinent surgical history.    Current Outpatient Medications   Medication Sig Dispense Refill    ascorbic acid, vitamin C, (VITAMIN C) 1000 MG tablet Take 1,000 mg by mouth once daily.      b complex vitamins capsule Take 1 capsule by mouth once daily.      hydroCHLOROthiazide (HYDRODIURIL) 25 MG tablet Take 1 tablet (25 mg total) by mouth once daily. Needs office visit for further refills 90 tablet 1    losartan (COZAAR) 50 MG tablet Take 1 tablet (50 mg total) by mouth once daily. Needs office visit for further refills 90 tablet 1    norgestrel-ethinyl estradioL (LO/OVRAL) 0.3-30 mg-mcg per tablet Take 1 tablet by mouth once daily. 28 tablet 11    nystatin-triamcinolone (MYCOLOG II) cream Apply to affected area 2 times daily 30 g 1    ondansetron (ZOFRAN-ODT) 4 MG TbDL Take 1 tablet (4 mg total) by mouth every 12 (twelve) hours as needed (nausea). 20 tablet 0    semaglutide (OZEMPIC) 0.25 mg or 0.5 mg(2 mg/1.5 mL) pen injector Inject 0.25 mg into the skin every 7 days. 1 pen 0    spironolactone (ALDACTONE) 25 MG tablet Take 1 tablet (25 mg total) by mouth once daily. 90 tablet 1    triamcinolone acetonide 0.1% (KENALOG) 0.1 % cream Apply topically 2 (two) times daily. 45 g 0    albuterol (PROAIR HFA) 90 mcg/actuation inhaler Inhale 2 puffs into the lungs every 6 (six) hours as needed for Shortness of Breath. Rescue (Patient not taking: Reported on 9/19/2022) 18 g 0    benzonatate (TESSALON PERLES) 100 MG capsule Take 1 capsule (100 mg total) by mouth 3 (three) times daily as needed for Cough. (Patient not taking: Reported on 12/2/2022) 30 capsule 0    promethazine (PHENERGAN) 6.25 mg/5 mL  "syrup Take 5 mLs (6.25 mg total) by mouth nightly as needed (cough). (Patient not taking: Reported on 2022) 75 mL 0     No current facility-administered medications for this visit.       Review of patient's allergies indicates:   Allergen Reactions    Lisinopril      Lip swelling       Family History   Problem Relation Age of Onset    Hypertension Mother     Breast cancer Paternal Grandmother     Ovarian cancer Neg Hx        Social History     Tobacco Use    Smoking status: Never    Smokeless tobacco: Never   Substance Use Topics    Alcohol use: Yes     Comment: once every 3 months    Drug use: Never       OB History    Para Term  AB Living   0 0 0 0 0 0   SAB IAB Ectopic Multiple Live Births   0 0 0 0 0         /82   Ht 5' 8" (1.727 m)   Wt (!) 230.9 kg (509 lb 0.7 oz)   LMP  (LMP Unknown)   Physical Exam:  APPEARANCE: Well nourished, well developed, in no acute distress.  SKIN: Normal skin turgor, no lesions.  NECK: Neck symmetric without masses   RESPIRATORY: Normal respiratory effort with no retractions or use of accessory muscles  CARDIOVASCULAR: Peripheral vascular system with no swelling no varicosities and palpation of pulses normal  LYMPHATIC: No enlargements of the lymph nodes noted in the neck, axillae, or groin  ABDOMEN: Soft. No tenderness or masses. No hepatosplenomegaly. No hernias.  PELVIC: Normal external female genitalia without lesions. Normal hair distribution. Adequate perineal body, normal urethral meatus. Urethra with no masses.  Bladder nontender. Vagina moist and well rugated without lesions or discharge. Cervix pink and without lesions. No significant cystocele or rectocele. Bimanual exam showed uterus normal size, shape, position, mobile and nontender. Adnexa without masses or tenderness. Urethra and bladder normal. ++ string  EXTREMITIES: No clubbing cyanosis or edema.    ASSESSMENT/PLAN:  Screening for STD (sexually transmitted disease)  -     VAGINOSIS SCREEN " BY DNA PROBE  -     C. trachomatis/N. gonorrhoeae by AMP DNA [facility]; Cervix    Encounter for gynecological examination with abnormal finding  -     Liquid-Based Pap Smear, Diagnostic      30 minutes spent today spent preparing reviewing previous external notes, reviewing previous results, performing medical examination, ordering tests or medications, counseling and documenting.

## 2022-12-29 LAB
C TRACH DNA SPEC QL NAA+PROBE: NOT DETECTED
N GONORRHOEA DNA SPEC QL NAA+PROBE: NOT DETECTED

## 2022-12-30 ENCOUNTER — PATIENT MESSAGE (OUTPATIENT)
Dept: OBSTETRICS AND GYNECOLOGY | Facility: CLINIC | Age: 27
End: 2022-12-30
Payer: COMMERCIAL

## 2022-12-30 LAB
BACTERIAL VAGINOSIS DNA: POSITIVE
CANDIDA GLABRATA DNA: NEGATIVE
CANDIDA KRUSEI DNA: NEGATIVE
CANDIDA RRNA VAG QL PROBE: NEGATIVE
T VAGINALIS RRNA GENITAL QL PROBE: POSITIVE

## 2023-01-03 ENCOUNTER — TELEPHONE (OUTPATIENT)
Dept: OBSTETRICS AND GYNECOLOGY | Facility: CLINIC | Age: 28
End: 2023-01-03
Payer: COMMERCIAL

## 2023-01-03 RX ORDER — METRONIDAZOLE 500 MG/1
500 TABLET ORAL ONCE
Qty: 4 TABLET | Refills: 1 | Status: SHIPPED | OUTPATIENT
Start: 2023-01-03 | End: 2023-01-03

## 2023-01-03 RX ORDER — METRONIDAZOLE 500 MG/1
2000 TABLET ORAL ONCE
Qty: 4 TABLET | Refills: 1 | Status: SHIPPED | OUTPATIENT
Start: 2023-01-03 | End: 2023-01-03

## 2023-01-03 RX ORDER — METRONIDAZOLE 500 MG/1
500 TABLET ORAL ONCE
Qty: 4 TABLET | Refills: 0 | Status: SHIPPED | OUTPATIENT
Start: 2023-01-03 | End: 2023-01-03 | Stop reason: SDUPTHER

## 2023-01-03 NOTE — TELEPHONE ENCOUNTER
----- Message from Chelly Best LPN sent at 1/3/2023  1:45 PM CST -----  Contact: [phone]    ----- Message -----  From: Sheila Perdomo  Sent: 1/3/2023   9:37 AM CST  To: Jean Carlos [facility] Staff    Type:  Pharmacy Calling to Clarify an RX    Name of Caller:  Michelle   Pharmacy Name:  [facility]   Prescription Name:  metroNIDAZOLE (FLAGYL) 500 MG tablet  What do they need to clarify?:  dosage/ 4 tablets sent over   Best Call Back Number:  [phone]  Additional Information:  [facility] call back

## 2023-01-03 NOTE — TELEPHONE ENCOUNTER
Spoke to pt, informed medication sent, and scheduled followup appt 1/24/23 10:40. Pt verbalized understanding.

## 2023-01-24 ENCOUNTER — OFFICE VISIT (OUTPATIENT)
Dept: OBSTETRICS AND GYNECOLOGY | Facility: CLINIC | Age: 28
End: 2023-01-24
Payer: COMMERCIAL

## 2023-01-24 VITALS
WEIGHT: 293 LBS | SYSTOLIC BLOOD PRESSURE: 124 MMHG | BODY MASS INDEX: 44.41 KG/M2 | HEIGHT: 68 IN | DIASTOLIC BLOOD PRESSURE: 70 MMHG

## 2023-01-24 DIAGNOSIS — N89.8 VAGINAL DISCHARGE: ICD-10-CM

## 2023-01-24 DIAGNOSIS — A59.9 TRICHIMONIASIS: Primary | ICD-10-CM

## 2023-01-24 PROCEDURE — 3008F PR BODY MASS INDEX (BMI) DOCUMENTED: ICD-10-PCS | Mod: CPTII,S$GLB,, | Performed by: OBSTETRICS & GYNECOLOGY

## 2023-01-24 PROCEDURE — 3074F PR MOST RECENT SYSTOLIC BLOOD PRESSURE < 130 MM HG: ICD-10-PCS | Mod: CPTII,S$GLB,, | Performed by: OBSTETRICS & GYNECOLOGY

## 2023-01-24 PROCEDURE — 81514 NFCT DS BV&VAGINITIS DNA ALG: CPT | Performed by: OBSTETRICS & GYNECOLOGY

## 2023-01-24 PROCEDURE — 3078F PR MOST RECENT DIASTOLIC BLOOD PRESSURE < 80 MM HG: ICD-10-PCS | Mod: CPTII,S$GLB,, | Performed by: OBSTETRICS & GYNECOLOGY

## 2023-01-24 PROCEDURE — 3044F HG A1C LEVEL LT 7.0%: CPT | Mod: CPTII,S$GLB,, | Performed by: OBSTETRICS & GYNECOLOGY

## 2023-01-24 PROCEDURE — 99213 OFFICE O/P EST LOW 20 MIN: CPT | Mod: S$GLB,,, | Performed by: OBSTETRICS & GYNECOLOGY

## 2023-01-24 PROCEDURE — 3074F SYST BP LT 130 MM HG: CPT | Mod: CPTII,S$GLB,, | Performed by: OBSTETRICS & GYNECOLOGY

## 2023-01-24 PROCEDURE — 99213 PR OFFICE/OUTPT VISIT, EST, LEVL III, 20-29 MIN: ICD-10-PCS | Mod: S$GLB,,, | Performed by: OBSTETRICS & GYNECOLOGY

## 2023-01-24 PROCEDURE — 99999 PR PBB SHADOW E&M-EST. PATIENT-LVL III: CPT | Mod: PBBFAC,,, | Performed by: OBSTETRICS & GYNECOLOGY

## 2023-01-24 PROCEDURE — 99999 PR PBB SHADOW E&M-EST. PATIENT-LVL III: ICD-10-PCS | Mod: PBBFAC,,, | Performed by: OBSTETRICS & GYNECOLOGY

## 2023-01-24 PROCEDURE — 3078F DIAST BP <80 MM HG: CPT | Mod: CPTII,S$GLB,, | Performed by: OBSTETRICS & GYNECOLOGY

## 2023-01-24 PROCEDURE — 3044F PR MOST RECENT HEMOGLOBIN A1C LEVEL <7.0%: ICD-10-PCS | Mod: CPTII,S$GLB,, | Performed by: OBSTETRICS & GYNECOLOGY

## 2023-01-24 PROCEDURE — 3008F BODY MASS INDEX DOCD: CPT | Mod: CPTII,S$GLB,, | Performed by: OBSTETRICS & GYNECOLOGY

## 2023-01-24 RX ORDER — TINIDAZOLE 500 MG/1
2 TABLET ORAL ONCE
Qty: 4 TABLET | Refills: 0 | Status: SHIPPED | OUTPATIENT
Start: 2023-01-24 | End: 2023-01-24

## 2023-01-24 NOTE — PROGRESS NOTES
Chief Complaint   Patient presents with    Follow-up       History of Present Illness: Juanita Del Castillo is a 27 y.o. female that presents today 1/24/2023 for   Chief Complaint   Patient presents with    Follow-up         Past Medical History:   Diagnosis Date    HTN (hypertension)        No past surgical history on file.    Current Outpatient Medications   Medication Sig Dispense Refill    ascorbic acid, vitamin C, (VITAMIN C) 1000 MG tablet Take 1,000 mg by mouth once daily.      b complex vitamins capsule Take 1 capsule by mouth once daily.      hydroCHLOROthiazide (HYDRODIURIL) 25 MG tablet Take 1 tablet (25 mg total) by mouth once daily. Needs office visit for further refills 90 tablet 1    losartan (COZAAR) 50 MG tablet Take 1 tablet (50 mg total) by mouth once daily. Needs office visit for further refills 90 tablet 1    nystatin-triamcinolone (MYCOLOG II) cream Apply to affected area 2 times daily 30 g 1    ondansetron (ZOFRAN-ODT) 4 MG TbDL Take 1 tablet (4 mg total) by mouth every 12 (twelve) hours as needed (nausea). 20 tablet 0    semaglutide (OZEMPIC) 0.25 mg or 0.5 mg(2 mg/1.5 mL) pen injector Inject 0.5 mg into the skin every 7 days. 1 pen 0    spironolactone (ALDACTONE) 25 MG tablet Take 1 tablet (25 mg total) by mouth once daily. 90 tablet 1    triamcinolone acetonide 0.1% (KENALOG) 0.1 % cream Apply topically 2 (two) times daily. 45 g 0    albuterol (PROAIR HFA) 90 mcg/actuation inhaler Inhale 2 puffs into the lungs every 6 (six) hours as needed for Shortness of Breath. Rescue 18 g 0    norgestrel-ethinyl estradioL (LO/OVRAL) 0.3-30 mg-mcg per tablet Take 1 tablet by mouth once daily. 28 tablet 11    tinidazole (TINDAMAX) 500 MG tablet Take 4 tablets (2 g total) by mouth once. for 1 dose 4 tablet 0     No current facility-administered medications for this visit.       Review of patient's allergies indicates:   Allergen Reactions    Lisinopril      Lip swelling       Family History   Problem Relation  "Age of Onset    Hypertension Mother     Breast cancer Paternal Grandmother     Ovarian cancer Neg Hx        Social History     Tobacco Use    Smoking status: Never    Smokeless tobacco: Never   Substance Use Topics    Alcohol use: Yes     Comment: once every 3 months    Drug use: Never       OB History    Para Term  AB Living   0 0 0 0 0 0   SAB IAB Ectopic Multiple Live Births   0 0 0 0 0         /70   Ht 5' 8" (1.727 m)   Wt (!) 226.4 kg (499 lb 1.9 oz)   LMP 2022   Physical Exam:  APPEARANCE: Well nourished, well developed, in no acute distress.  SKIN: Normal skin turgor, no lesions.  NECK: Neck symmetric without masses   RESPIRATORY: Normal respiratory effort with no retractions or use of accessory muscles  CARDIOVASCULAR: Peripheral vascular system with no swelling no varicosities and palpation of pulses normal  LYMPHATIC: No enlargements of the lymph nodes noted in the neck, axillae, or groin  ABDOMEN: Soft. No tenderness or masses. No hepatosplenomegaly. No hernias.  PELVIC: Normal external female genitalia without lesions. Normal hair distribution. Adequate perineal body, normal urethral meatus. Urethra with no masses.  Bladder nontender. Vagina thick yellow +++ discharge. Cervix pink and without lesions. No significant cystocele or rectocele. Bimanual exam showed uterus normal size, shape, position, mobile and nontender. Adnexa without masses or tenderness. Urethra and bladder normal.  EXTREMITIES: No clubbing cyanosis or edema.    ASSESSMENT/PLAN:  Trichimoniasis  -     tinidazole (TINDAMAX) 500 MG tablet; Take 4 tablets (2 g total) by mouth once. for 1 dose  Dispense: 4 tablet; Refill: 0      20 minutes spent today preparing reviewing previous external notes, reviewing previous results, performing medical examination, orders tests or medications, counseling and documenting.         "

## 2023-01-26 LAB
BACTERIAL VAGINOSIS DNA: NEGATIVE
CANDIDA GLABRATA DNA: NEGATIVE
CANDIDA KRUSEI DNA: NEGATIVE
CANDIDA RRNA VAG QL PROBE: NEGATIVE
T VAGINALIS RRNA GENITAL QL PROBE: POSITIVE

## 2023-01-27 ENCOUNTER — TELEPHONE (OUTPATIENT)
Dept: OBSTETRICS AND GYNECOLOGY | Facility: CLINIC | Age: 28
End: 2023-01-27
Payer: COMMERCIAL

## 2023-01-27 RX ORDER — TINIDAZOLE 500 MG/1
500 TABLET ORAL
Qty: 20 TABLET | Refills: 0 | Status: SHIPPED | OUTPATIENT
Start: 2023-01-27 | End: 2023-02-06

## 2023-01-27 NOTE — TELEPHONE ENCOUNTER
Notified that more medication was sent in and she is going to continue to take til finished and then schedule a TUNDE appointment she verbalized understanding with no further questions.

## 2023-02-15 ENCOUNTER — OFFICE VISIT (OUTPATIENT)
Dept: OBSTETRICS AND GYNECOLOGY | Facility: CLINIC | Age: 28
End: 2023-02-15
Payer: COMMERCIAL

## 2023-02-15 VITALS
DIASTOLIC BLOOD PRESSURE: 74 MMHG | WEIGHT: 293 LBS | HEIGHT: 68 IN | SYSTOLIC BLOOD PRESSURE: 126 MMHG | BODY MASS INDEX: 44.41 KG/M2

## 2023-02-15 DIAGNOSIS — Z30.431 IUD CHECK UP: ICD-10-CM

## 2023-02-15 DIAGNOSIS — A59.9 TRICHIMONIASIS: ICD-10-CM

## 2023-02-15 DIAGNOSIS — L68.9 EXCESSIVE HAIR GROWTH: ICD-10-CM

## 2023-02-15 DIAGNOSIS — Z11.3 SCREENING FOR STD (SEXUALLY TRANSMITTED DISEASE): Primary | ICD-10-CM

## 2023-02-15 PROCEDURE — 1159F MED LIST DOCD IN RCRD: CPT | Mod: CPTII,S$GLB,, | Performed by: OBSTETRICS & GYNECOLOGY

## 2023-02-15 PROCEDURE — 3044F PR MOST RECENT HEMOGLOBIN A1C LEVEL <7.0%: ICD-10-PCS | Mod: CPTII,S$GLB,, | Performed by: OBSTETRICS & GYNECOLOGY

## 2023-02-15 PROCEDURE — 81514 NFCT DS BV&VAGINITIS DNA ALG: CPT | Performed by: OBSTETRICS & GYNECOLOGY

## 2023-02-15 PROCEDURE — 3074F SYST BP LT 130 MM HG: CPT | Mod: CPTII,S$GLB,, | Performed by: OBSTETRICS & GYNECOLOGY

## 2023-02-15 PROCEDURE — 1159F PR MEDICATION LIST DOCUMENTED IN MEDICAL RECORD: ICD-10-PCS | Mod: CPTII,S$GLB,, | Performed by: OBSTETRICS & GYNECOLOGY

## 2023-02-15 PROCEDURE — 99213 OFFICE O/P EST LOW 20 MIN: CPT | Mod: S$GLB,,, | Performed by: OBSTETRICS & GYNECOLOGY

## 2023-02-15 PROCEDURE — 3008F BODY MASS INDEX DOCD: CPT | Mod: CPTII,S$GLB,, | Performed by: OBSTETRICS & GYNECOLOGY

## 2023-02-15 PROCEDURE — 3078F PR MOST RECENT DIASTOLIC BLOOD PRESSURE < 80 MM HG: ICD-10-PCS | Mod: CPTII,S$GLB,, | Performed by: OBSTETRICS & GYNECOLOGY

## 2023-02-15 PROCEDURE — 99213 PR OFFICE/OUTPT VISIT, EST, LEVL III, 20-29 MIN: ICD-10-PCS | Mod: S$GLB,,, | Performed by: OBSTETRICS & GYNECOLOGY

## 2023-02-15 PROCEDURE — 3074F PR MOST RECENT SYSTOLIC BLOOD PRESSURE < 130 MM HG: ICD-10-PCS | Mod: CPTII,S$GLB,, | Performed by: OBSTETRICS & GYNECOLOGY

## 2023-02-15 PROCEDURE — 3008F PR BODY MASS INDEX (BMI) DOCUMENTED: ICD-10-PCS | Mod: CPTII,S$GLB,, | Performed by: OBSTETRICS & GYNECOLOGY

## 2023-02-15 PROCEDURE — 99999 PR PBB SHADOW E&M-EST. PATIENT-LVL III: ICD-10-PCS | Mod: PBBFAC,,, | Performed by: OBSTETRICS & GYNECOLOGY

## 2023-02-15 PROCEDURE — 3044F HG A1C LEVEL LT 7.0%: CPT | Mod: CPTII,S$GLB,, | Performed by: OBSTETRICS & GYNECOLOGY

## 2023-02-15 PROCEDURE — 3078F DIAST BP <80 MM HG: CPT | Mod: CPTII,S$GLB,, | Performed by: OBSTETRICS & GYNECOLOGY

## 2023-02-15 PROCEDURE — 99999 PR PBB SHADOW E&M-EST. PATIENT-LVL III: CPT | Mod: PBBFAC,,, | Performed by: OBSTETRICS & GYNECOLOGY

## 2023-02-15 RX ORDER — SPIRONOLACTONE 50 MG/1
50 TABLET, FILM COATED ORAL DAILY
Qty: 30 TABLET | Refills: 11 | Status: SHIPPED | OUTPATIENT
Start: 2023-02-15 | End: 2023-11-17 | Stop reason: SDUPTHER

## 2023-02-15 NOTE — PROGRESS NOTES
Chief Complaint   Patient presents with    TUNDE       History of Present Illness: Juanita Del Castillo is a 27 y.o. female that presents today 2/15/2023 for   Chief Complaint   Patient presents with    TUNDE         Past Medical History:   Diagnosis Date    HTN (hypertension)        History reviewed. No pertinent surgical history.    Current Outpatient Medications   Medication Sig Dispense Refill    ascorbic acid, vitamin C, (VITAMIN C) 1000 MG tablet Take 1,000 mg by mouth once daily.      b complex vitamins capsule Take 1 capsule by mouth once daily.      losartan (COZAAR) 50 MG tablet Take 1 tablet (50 mg total) by mouth once daily. Needs office visit for further refills 90 tablet 1    nystatin-triamcinolone (MYCOLOG II) cream Apply to affected area 2 times daily 30 g 1    ondansetron (ZOFRAN-ODT) 4 MG TbDL Take 1 tablet (4 mg total) by mouth every 12 (twelve) hours as needed (nausea). 20 tablet 0    semaglutide (OZEMPIC) 0.25 mg or 0.5 mg(2 mg/1.5 mL) pen injector Inject 0.5 mg into the skin every 7 days. 1 pen 0    triamcinolone acetonide 0.1% (KENALOG) 0.1 % cream Apply topically 2 (two) times daily. 45 g 0    albuterol (PROAIR HFA) 90 mcg/actuation inhaler Inhale 2 puffs into the lungs every 6 (six) hours as needed for Shortness of Breath. Rescue 18 g 0    spironolactone (ALDACTONE) 50 MG tablet Take 1 tablet (50 mg total) by mouth once daily. 30 tablet 11     No current facility-administered medications for this visit.       Review of patient's allergies indicates:   Allergen Reactions    Lisinopril      Lip swelling       Family History   Problem Relation Age of Onset    Hypertension Mother     Breast cancer Paternal Grandmother     Ovarian cancer Neg Hx        Social History     Tobacco Use    Smoking status: Never    Smokeless tobacco: Never   Substance Use Topics    Alcohol use: Yes     Comment: once every 3 months    Drug use: Never       OB History    Para Term  AB Living   0 0 0 0 0 0   SAB IAB  "Ectopic Multiple Live Births   0 0 0 0 0         /74   Ht 5' 8" (1.727 m)   Wt (!) 229.7 kg (506 lb 6.4 oz)   LMP 01/03/2023 (Exact Date)   Physical Exam:  APPEARANCE: Well nourished, well developed, in no acute distress.  SKIN: Normal skin turgor, no lesions.  NECK: Neck symmetric without masses LYMPHATIC: No enlargements of the lymph nodes noted in the neck, axillae, or groin  ABDOMEN: Soft. No tenderness or masses. No hepatosplenomegaly. No hernias.  PELVIC: Normal external female genitalia without lesions. Normal hair distribution. Adequate perineal body, normal urethral meatus. Urethra with no masses.  Bladder nontender. Vagina moist and well rugated without lesions or discharge. Cervix pink and without lesions. No significant cystocele or rectocele. Bimanual exam showed uterus normal size, shape, position, mobile and nontender. Adnexa without masses or tenderness. Urethra and bladder normal. String in place  EXTREMITIES: No clubbing cyanosis or edema.    ASSESSMENT/PLAN:  Screening for STD (sexually transmitted disease)  -     VAGINOSIS SCREEN BY DNA PROBE    Trichimoniasis  -     VAGINOSIS SCREEN BY DNA PROBE    IUD check up    Excessive hair growth  -     spironolactone (ALDACTONE) 50 MG tablet; Take 1 tablet (50 mg total) by mouth once daily.  Dispense: 30 tablet; Refill: 11        20 minutes spent today preparing reviewing previous external notes, reviewing previous results, performing medical examination, orders tests or medications, counseling and documenting.       "

## 2023-02-16 ENCOUNTER — TELEPHONE (OUTPATIENT)
Dept: OBSTETRICS AND GYNECOLOGY | Facility: CLINIC | Age: 28
End: 2023-02-16
Payer: COMMERCIAL

## 2023-02-16 LAB
BACTERIAL VAGINOSIS DNA: POSITIVE
CANDIDA GLABRATA DNA: NEGATIVE
CANDIDA KRUSEI DNA: NEGATIVE
CANDIDA RRNA VAG QL PROBE: NEGATIVE
T VAGINALIS RRNA GENITAL QL PROBE: NEGATIVE

## 2023-02-16 RX ORDER — TINIDAZOLE 500 MG/1
2 TABLET ORAL DAILY
Qty: 8 TABLET | Refills: 0 | Status: SHIPPED | OUTPATIENT
Start: 2023-02-16 | End: 2023-04-17

## 2023-02-16 NOTE — TELEPHONE ENCOUNTER
Notified pt of results per Dr Evangelista instructions: Tindamax course for BV     Trich is cleared      Good news  Pt verbalized understanding.

## 2023-04-27 ENCOUNTER — PATIENT MESSAGE (OUTPATIENT)
Dept: OBSTETRICS AND GYNECOLOGY | Facility: CLINIC | Age: 28
End: 2023-04-27
Payer: COMMERCIAL

## 2023-07-14 ENCOUNTER — TELEPHONE (OUTPATIENT)
Dept: BARIATRICS | Facility: CLINIC | Age: 28
End: 2023-07-14
Payer: COMMERCIAL

## 2023-07-27 ENCOUNTER — TELEPHONE (OUTPATIENT)
Dept: BARIATRICS | Facility: CLINIC | Age: 28
End: 2023-07-27
Payer: COMMERCIAL

## 2023-07-27 NOTE — TELEPHONE ENCOUNTER
wasn't interested in surgery she wants the balloon. I explained that [facility] does that procedure and it is cash pay because insurance doesn't cover procedure. Pt would like to think about her options before she schedules an appt to discuss surgery. I told pt I understood.

## 2023-08-28 ENCOUNTER — TELEPHONE (OUTPATIENT)
Dept: BARIATRICS | Facility: CLINIC | Age: 28
End: 2023-08-28
Payer: COMMERCIAL

## 2023-08-28 NOTE — TELEPHONE ENCOUNTER
----- Message from Lin Loya sent at 8/28/2023  9:31 AM CDT -----  Regarding: appt access  Type:  Sooner Appointment Request    Caller is requesting a sooner appointment.  Caller declined first available appointment listed below.  Caller will not accept being placed on the waitlist and is requesting a message be sent to doctor.    Name of Caller:  pt   When is the first available appointment?  unk  Symptoms:  weight loss sx   Best Call Back Number:  [phone] (home)     Additional Information:  requesting a appt and call back asap please advise thank you

## 2023-10-03 ENCOUNTER — OFFICE VISIT (OUTPATIENT)
Dept: BARIATRICS | Facility: CLINIC | Age: 28
End: 2023-10-03
Payer: COMMERCIAL

## 2023-10-03 VITALS
HEIGHT: 70 IN | RESPIRATION RATE: 16 BRPM | BODY MASS INDEX: 41.95 KG/M2 | SYSTOLIC BLOOD PRESSURE: 143 MMHG | DIASTOLIC BLOOD PRESSURE: 77 MMHG | WEIGHT: 293 LBS | HEART RATE: 83 BPM | TEMPERATURE: 98 F

## 2023-10-03 DIAGNOSIS — E66.01 MORBID OBESITY: Primary | ICD-10-CM

## 2023-10-03 DIAGNOSIS — E66.01 MORBID OBESITY WITH BMI OF 70 AND OVER, ADULT: ICD-10-CM

## 2023-10-03 DIAGNOSIS — I10 ESSENTIAL HYPERTENSION: ICD-10-CM

## 2023-10-03 DIAGNOSIS — G47.33 OSA (OBSTRUCTIVE SLEEP APNEA): ICD-10-CM

## 2023-10-03 PROCEDURE — 1159F MED LIST DOCD IN RCRD: CPT | Mod: CPTII,S$GLB,, | Performed by: SURGERY

## 2023-10-03 PROCEDURE — 99205 OFFICE O/P NEW HI 60 MIN: CPT | Mod: S$GLB,,, | Performed by: SURGERY

## 2023-10-03 PROCEDURE — 3008F PR BODY MASS INDEX (BMI) DOCUMENTED: ICD-10-PCS | Mod: CPTII,S$GLB,, | Performed by: SURGERY

## 2023-10-03 PROCEDURE — 3078F DIAST BP <80 MM HG: CPT | Mod: CPTII,S$GLB,, | Performed by: SURGERY

## 2023-10-03 PROCEDURE — 1159F PR MEDICATION LIST DOCUMENTED IN MEDICAL RECORD: ICD-10-PCS | Mod: CPTII,S$GLB,, | Performed by: SURGERY

## 2023-10-03 PROCEDURE — 3044F PR MOST RECENT HEMOGLOBIN A1C LEVEL <7.0%: ICD-10-PCS | Mod: CPTII,S$GLB,, | Performed by: SURGERY

## 2023-10-03 PROCEDURE — 4010F ACE/ARB THERAPY RXD/TAKEN: CPT | Mod: CPTII,S$GLB,, | Performed by: SURGERY

## 2023-10-03 PROCEDURE — 3044F HG A1C LEVEL LT 7.0%: CPT | Mod: CPTII,S$GLB,, | Performed by: SURGERY

## 2023-10-03 PROCEDURE — 3077F PR MOST RECENT SYSTOLIC BLOOD PRESSURE >= 140 MM HG: ICD-10-PCS | Mod: CPTII,S$GLB,, | Performed by: SURGERY

## 2023-10-03 PROCEDURE — 99999 PR PBB SHADOW E&M-EST. PATIENT-LVL V: ICD-10-PCS | Mod: PBBFAC,,, | Performed by: SURGERY

## 2023-10-03 PROCEDURE — 3078F PR MOST RECENT DIASTOLIC BLOOD PRESSURE < 80 MM HG: ICD-10-PCS | Mod: CPTII,S$GLB,, | Performed by: SURGERY

## 2023-10-03 PROCEDURE — 99999 PR PBB SHADOW E&M-EST. PATIENT-LVL V: CPT | Mod: PBBFAC,,, | Performed by: SURGERY

## 2023-10-03 PROCEDURE — 99205 PR OFFICE/OUTPT VISIT, NEW, LEVL V, 60-74 MIN: ICD-10-PCS | Mod: S$GLB,,, | Performed by: SURGERY

## 2023-10-03 PROCEDURE — 4010F PR ACE/ARB THEARPY RXD/TAKEN: ICD-10-PCS | Mod: CPTII,S$GLB,, | Performed by: SURGERY

## 2023-10-03 PROCEDURE — 3008F BODY MASS INDEX DOCD: CPT | Mod: CPTII,S$GLB,, | Performed by: SURGERY

## 2023-10-03 PROCEDURE — 1160F RVW MEDS BY RX/DR IN RCRD: CPT | Mod: CPTII,S$GLB,, | Performed by: SURGERY

## 2023-10-03 PROCEDURE — 3077F SYST BP >= 140 MM HG: CPT | Mod: CPTII,S$GLB,, | Performed by: SURGERY

## 2023-10-03 PROCEDURE — 1160F PR REVIEW ALL MEDS BY PRESCRIBER/CLIN PHARMACIST DOCUMENTED: ICD-10-PCS | Mod: CPTII,S$GLB,, | Performed by: SURGERY

## 2023-10-03 NOTE — PROGRESS NOTES
Initial Consult    Chief Complaint   Patient presents with    Obesity       History of Present Illness:  Patient is a 27 y.o. female who is referred for evaluation of surgical treatment of morbid obesity. Her Body mass index is 73.74 kg/m². She has known comorbidities of hypertension and obstructive sleep apnea. She has attended the bariatric seminar and is most interested in  hearing options .      Past attempts at weight loss include: Unsupervised: gym membership;  Supervised:  diet pills from [employer], weight watchers;  Diet pills: ozempic;  Exercise attempts: walking or running, treadmill, group classes, weight training    Weight history:   At current weight:  3 years  Obese for 17.  More than 35 pounds overweight for 19.  More than 100 pounds overweight for 19.  Started dieting at 27 years old.  Maximum weight reached: 510 pounds  Most weight lost was 50 pounds through na for 1 years.  She describes Her eating habits as sweet eater, snacker/grazer, binge eater,emotional eater.    NAJMA screening: tested positive - no machine     Reflux screening: none    Review of patient's allergies indicates:   Allergen Reactions    Lisinopril      Lip swelling       Current Outpatient Medications   Medication Sig Dispense Refill    albuterol (PROAIR HFA) 90 mcg/actuation inhaler Inhale 2 puffs into the lungs every 6 (six) hours as needed for Shortness of Breath. Rescue 18 g 0    EScitalopram oxalate (LEXAPRO) 10 MG tablet Take 1 tablet (10 mg total) by mouth once daily. 90 tablet 1    losartan (COZAAR) 50 MG tablet Take 1 tablet (50 mg total) by mouth once daily. Needs office visit for further refills 90 tablet 1    ondansetron (ZOFRAN-ODT) 4 MG TbDL Take 1 tablet (4 mg total) by mouth every 12 (twelve) hours as needed (nausea). 20 tablet 0    spironolactone (ALDACTONE) 50 MG tablet Take 1 tablet (50 mg total) by mouth once daily. 30 tablet 11     No current facility-administered medications for this visit.       Past  "Medical History:   Diagnosis Date    HTN (hypertension)     NAJMA (obstructive sleep apnea)      Past Surgical History:   Procedure Laterality Date    TONSILLECTOMY         Family History   Problem Relation Age of Onset    Hypertension Mother     Obesity Mother     Obesity Father     Obesity Paternal Aunt     Breast cancer Paternal Grandmother     Ovarian cancer Neg Hx      Social History     Tobacco Use    Smoking status: Never    Smokeless tobacco: Never   Substance Use Topics    Alcohol use: Yes     Comment: once every 3 months    Drug use: Never          Review of Systems   All other systems reviewed and are negative.      Physical:     Vital Signs (Most Recent)  Temp: 98.3 °F (36.8 °C) (10/03/23 0838)  Pulse: 83 (10/03/23 0838)  Resp: 16 (10/03/23 0838)  BP: (!) 143/77 (10/03/23 0838)  5' 9.5" (1.765 m)  (!) 229.8 kg (506 lb 9.9 oz)     Body comp:  Fat Percent:  60.0 %  Fat Mass:  302.6 lb  FFM:  2\01.6 lb  BMR: 3192 kcal      Physical Exam  Vitals and nursing note reviewed.   Constitutional:       General: She is not in acute distress.     Appearance: She is well-developed. She is not diaphoretic.   HENT:      Head: Normocephalic and atraumatic.      Mouth/Throat:      Pharynx: No oropharyngeal exudate.   Eyes:      General: No scleral icterus.     Conjunctiva/sclera: Conjunctivae normal.      Pupils: Pupils are equal, round, and reactive to light.   Neck:      Thyroid: No thyromegaly.      Vascular: No JVD.      Trachea: No tracheal deviation.   Cardiovascular:      Rate and Rhythm: Normal rate and regular rhythm.      Heart sounds: Normal heart sounds. No murmur heard.     No friction rub. No gallop.   Pulmonary:      Effort: Pulmonary effort is normal. No respiratory distress.      Breath sounds: Normal breath sounds. No stridor. No wheezing or rales.   Chest:      Chest wall: No tenderness.   Abdominal:      General: Bowel sounds are normal. There is no distension.      Palpations: Abdomen is soft. There is " no mass.      Tenderness: There is no abdominal tenderness. There is no guarding or rebound.   Musculoskeletal:         General: No tenderness. Normal range of motion.      Cervical back: Normal range of motion and neck supple.   Lymphadenopathy:      Cervical: No cervical adenopathy.   Skin:     General: Skin is warm and dry.      Findings: No erythema or rash.   Neurological:      Mental Status: She is alert and oriented to person, place, and time.      Cranial Nerves: No cranial nerve deficit.   Psychiatric:         Behavior: Behavior normal.         ASSESSMENT/PLAN:        1. Morbid obesity  Ambulatory referral/consult to Psychiatry    Ambulatory referral/consult to Nutrition Services    EKG 12-lead    FL Upper GI      2. Morbid obesity with BMI of 70 and over, adult        3. Essential hypertension        4. NAJMA (obstructive sleep apnea)            Plan:  Juanita Del Castillo has morbid obesity as their Body mass index is 73.74 kg/m². She would benefit from weight loss surgery and has chosen gastric sleeve surgery as the preferred procedure. She understands that this is a tool and lifestyle change will be necessary to keep weight off. I went over possible complications of all surgeries with the patient and she is agreeable to surgery.    She will need:    Labs  EKG  UGI   dietary consult  psych consult   Seminar      I will obtain the following clearances prior to surgery: none    She has multiple worsening medical problems which include: obesity, htn, sleep apnea.  We are planning to treat this with diet, exercise, and possibly elective major surgery.  She has risk factors for elective major surgery which include: htn, najma    Diet plan: high protein low carb- mainly meats and vegetables  Exercise plan: Cardiovascular exercise, get HR over 100 for 20 minutes 3 times per week.  Start multivitamin

## 2023-10-06 ENCOUNTER — CLINICAL SUPPORT (OUTPATIENT)
Dept: BARIATRICS | Facility: CLINIC | Age: 28
End: 2023-10-06
Payer: MEDICAID

## 2023-10-06 VITALS — BODY MASS INDEX: 41.95 KG/M2 | HEIGHT: 70 IN | WEIGHT: 293 LBS

## 2023-10-06 DIAGNOSIS — E66.01 MORBID OBESITY: Primary | ICD-10-CM

## 2023-10-06 DIAGNOSIS — Z71.3 ENCOUNTER FOR DIETARY COUNSELING AND SURVEILLANCE: ICD-10-CM

## 2023-10-06 PROCEDURE — 99999 PR PBB SHADOW E&M-EST. PATIENT-LVL II: ICD-10-PCS | Mod: PBBFAC,,,

## 2023-10-06 PROCEDURE — 99999PBSHW PR PBB SHADOW TECHNICAL ONLY FILED TO HB: ICD-10-PCS | Mod: PBBFAC,,,

## 2023-10-06 PROCEDURE — 97802 MEDICAL NUTRITION INDIV IN: CPT | Mod: PBBFAC,PO

## 2023-10-06 PROCEDURE — 99999PBSHW PR PBB SHADOW TECHNICAL ONLY FILED TO HB: Mod: PBBFAC,,,

## 2023-10-06 PROCEDURE — 99999 PR PBB SHADOW E&M-EST. PATIENT-LVL II: CPT | Mod: PBBFAC,,,

## 2023-10-06 PROCEDURE — 99212 OFFICE O/P EST SF 10 MIN: CPT | Mod: PBBFAC,PO

## 2023-10-06 NOTE — PROGRESS NOTES
"NUTRITIONAL CONSULT    Referring Physician: Dr. Vaz  Reason for MNT Referral: Initial assessment for sleeve gastrectomy work-up    PAST MEDICAL HISTORY:   27 y.o. female presents with a BMI of Body mass index is 73.84 kg/m²..    Past attempts at weight loss include: Unsupervised: gym membership;  Supervised:  diet pills from [employer], weight watchers;  Diet pills: ozempic;  Exercise attempts: walking or running, treadmill, group classes, weight training     Weight history:   At current weight:  3 years  Obese for 19.  More than 35 pounds overweight for 19.  More than 100 pounds overweight for 19.  Started dieting at 27 years old.  Maximum weight reached: 510 pounds  Most weight lost was 50 pounds through na for 1 years.  She describes Her eating habits as sweet eater, snacker/grazer, binge eater,emotional eater.     NAJMA screening: tested positive - no machine      Reflux screening: none    Past Medical History:   Diagnosis Date    HTN (hypertension)     NAJMA (obstructive sleep apnea)        CLINICAL DATA:  27 y.o.-year-old Black or  female.  Height: 5'9.5"  Weight: 507 lbs  IBW: 148 lbs  BMI: 73.84  The patient's goal weight (50 % EBW): 330 lbs    Goal for Bariatric Surgery: to improve health, to improve quality of life, and to prevent future medical conditions    NUTRITION & HEALTH HISTORY:  Greatest challenge: sweets, irregular meal patterns, emotional eating, and snacking    Current diet recall:    Breakfast- protein bar or marquez + Serbian toast   Lunch - chicken, rice, gravy, broccoli/cauliflower mix   Dinner- Skips   Snacks - cheese, chips    Current Diet:  Meal pattern: Irregular  Protein supplements: None  Snackin-2 / day  Vegetables: Likes a variety. Eats almost daily.  Fruits: Likes a variety. Eats 2-3 times per week.  Beverages: water w/ SF flavoring, coffee, celcius packets, OJ or crangrape juice. No soda or tea.  Dining out: x1-2, Monthly. Mostly fast food, restaurants, and " take-out.  Cooking at home: Weekly. Mostly baked and airfryed  meat, fish, starchy CHO, and vegetables.    Exercise:  Past exercise: Fair, walking    Current exercise: Fair, [employer] membership for off days. 30 minute workout classes 3-4  Restrictions to exercise: None    Vitamins / Minerals / Herbs: None    Food Allergies: NKFA; no intolerances    Social:  3- 12 hour shifts/week.  Lives with mom and brother.  Grocery shopping and food prep combination, mainly self on workdays.  Patient believes the household will be supportive after surgery.  Alcohol: Socially, [place] iced tea.  Smoking: None.    ASSESSMENT:  Patient reports attempts at weight loss, only to regain lost weight.  Patient demonstrated knowledge of healthy eating behaviors and exercise patterns; admits to not eating healthy and not exercising at this point.  Patient demonstrates willingness to change lifestyle and make behavior modifications. Reports sweets as biggest challenge. She has started keeping them out of the house.   Expect fair  compliance after surgery at this time.    Insurance requires medically supervised diet prior to consideration for bariatric surgery.    BARIATRIC DIET DISCUSSION:  Discussed diet after surgery and related to patients food record.  Reviewed diet progression before and after surgery.  Reinforced that surgery is not a magic bullet and importance of low fat foods and no snacking.  Stressed importance of exercise and its role in achieving weight loss goals.  Answered all questions.    RECOMMENDATIONS:  Patient is a potential candidate for bariatric surgery.    Needs additional visit(s) with RD.    PLAN:  Resume work-up for surgery.  Continue to review Bariatric Nutrition Guidebook at home and call with any questions.  Work on Bariatric Nutrition Checklist.  Work on expanding variety of vegetables.  Work on gradually cutting back on starchy CHO in the diet.  Begin trying various protein supplements to determine  preference.  Start including protein supplements in the diet plan daily.  Reduce frequency of dining out.  More grocery shopping and meal preparation at home.  Start shopping for bariatric vitamins & minerals.  Return to clinic.    SESSION TIME:  60 minutes

## 2023-10-12 ENCOUNTER — PATIENT MESSAGE (OUTPATIENT)
Dept: BARIATRICS | Facility: CLINIC | Age: 28
End: 2023-10-12
Payer: COMMERCIAL

## 2023-10-16 ENCOUNTER — OFFICE VISIT (OUTPATIENT)
Dept: PSYCHIATRY | Facility: CLINIC | Age: 28
End: 2023-10-16
Payer: COMMERCIAL

## 2023-10-16 DIAGNOSIS — I10 ESSENTIAL HYPERTENSION: ICD-10-CM

## 2023-10-16 DIAGNOSIS — E66.01 MORBID OBESITY: ICD-10-CM

## 2023-10-16 DIAGNOSIS — Z01.818 PREOPERATIVE EVALUATION TO RULE OUT SURGICAL CONTRAINDICATION: Primary | ICD-10-CM

## 2023-10-16 PROCEDURE — 3044F HG A1C LEVEL LT 7.0%: CPT | Mod: CPTII,S$GLB,, | Performed by: PSYCHOLOGIST

## 2023-10-16 PROCEDURE — 4010F ACE/ARB THERAPY RXD/TAKEN: CPT | Mod: CPTII,S$GLB,, | Performed by: PSYCHOLOGIST

## 2023-10-16 PROCEDURE — 90791 PSYCH DIAGNOSTIC EVALUATION: CPT | Mod: S$GLB,,, | Performed by: PSYCHOLOGIST

## 2023-10-16 PROCEDURE — 99999 PR PBB SHADOW E&M-EST. PATIENT-LVL II: CPT | Mod: PBBFAC,,, | Performed by: PSYCHOLOGIST

## 2023-10-16 PROCEDURE — 1159F PR MEDICATION LIST DOCUMENTED IN MEDICAL RECORD: ICD-10-PCS | Mod: CPTII,S$GLB,, | Performed by: PSYCHOLOGIST

## 2023-10-16 PROCEDURE — 96130 PR PSYCHOLOGIC TEST EVAL SVCS, 1ST HR: ICD-10-PCS | Mod: S$GLB,,, | Performed by: PSYCHOLOGIST

## 2023-10-16 PROCEDURE — 90791 PR PSYCHIATRIC DIAGNOSTIC EVALUATION: ICD-10-PCS | Mod: S$GLB,,, | Performed by: PSYCHOLOGIST

## 2023-10-16 PROCEDURE — 96139 PSYCL/NRPSYC TST TECH EA: CPT | Mod: S$GLB,,, | Performed by: PSYCHOLOGIST

## 2023-10-16 PROCEDURE — 96138 PSYCL/NRPSYC TECH 1ST: CPT | Mod: S$GLB,,, | Performed by: PSYCHOLOGIST

## 2023-10-16 PROCEDURE — 3044F PR MOST RECENT HEMOGLOBIN A1C LEVEL <7.0%: ICD-10-PCS | Mod: CPTII,S$GLB,, | Performed by: PSYCHOLOGIST

## 2023-10-16 PROCEDURE — 4010F PR ACE/ARB THEARPY RXD/TAKEN: ICD-10-PCS | Mod: CPTII,S$GLB,, | Performed by: PSYCHOLOGIST

## 2023-10-16 PROCEDURE — 96139 PR PSYCH/NEUROPSYCH TEST ADMIN/SCORING, BY TECH, 2+ TESTS, EA ADDTL 30 MIN: ICD-10-PCS | Mod: S$GLB,,, | Performed by: PSYCHOLOGIST

## 2023-10-16 PROCEDURE — 1159F MED LIST DOCD IN RCRD: CPT | Mod: CPTII,S$GLB,, | Performed by: PSYCHOLOGIST

## 2023-10-16 PROCEDURE — 99999 PR PBB SHADOW E&M-EST. PATIENT-LVL II: ICD-10-PCS | Mod: PBBFAC,,, | Performed by: PSYCHOLOGIST

## 2023-10-16 PROCEDURE — 96131 PSYCL TST EVAL PHYS/QHP EA: CPT | Mod: S$GLB,,, | Performed by: PSYCHOLOGIST

## 2023-10-16 PROCEDURE — 96138 PR PSYCH/NEUROPSYCH TEST ADMIN/SCORING, BY TECH, 2+ TESTS, 1ST 30 MIN: ICD-10-PCS | Mod: S$GLB,,, | Performed by: PSYCHOLOGIST

## 2023-10-16 PROCEDURE — 96131 PR PSYCHOLOGIC TEST EVAL SVCS, EA ADDTL HR: ICD-10-PCS | Mod: S$GLB,,, | Performed by: PSYCHOLOGIST

## 2023-10-16 PROCEDURE — 96130 PSYCL TST EVAL PHYS/QHP 1ST: CPT | Mod: S$GLB,,, | Performed by: PSYCHOLOGIST

## 2023-10-18 NOTE — PROGRESS NOTES
Psychiatry Initial Visit (PhD)   Diagnostic Interview - CPT 22183     Date: 10/16/2023    Site: [facility]      Referral source: Gabriel Vaz MD    Clinical status of patient: Outpatient     Juanita Del Castillo, a 27 y.o. female, presented for initial evaluation visit. Before this evaluation was initiated, the purposes and process of the assessment and the limits of confidentiality were discussed with the patient who expressed understanding of these issues and orally consented to proceed with the evaluation.     Chief complaint/reason for encounter: Routine psychological evaluation prior to bariatric surgery.     Type of surgery sought: Gastric sleeve    History of present illness: Ms. Del Castillo is a 27-year-old female, who is pursuing bariatric surgery to improve her health and quality of life. She has no history of significant psychological difficulties. She has never taken psychotropic medication, has never been hospitalized for psychiatric reasons, and denied any history of suicidality.  She has begun making positive lifestyle changes in anticipation for surgery, with good benefit. The patient has a Body Mass Index of 73.84 as documented by the referring provider.    Ms. Del Castillo has struggled with weight all [her] life.  Factors that have contributed to her weight gain over the years include: stress eating; eating while watching television; going out and trying new restaurants.   In addition, Ms. Del Castillo acknowledges that she is an emotional eater, with stress as her primary emotional trigger to overeat.  She is working on increasing her coping mechanisms for stress, including watching less television and exercising with friends at the gym.  She has tried many weight loss methods on her own (i.e. reducing intake; going to the gym; Ozempic) with little success, and she believes that her biggest weight loss challenge is maintaining consistency.  Her motivation for seeking surgery now is improving her health  and reducing her blood pressure.    Ms. Del Castillo has met with Ms. Rosmery RD, bariatric dietician, and reports that she has made the following lifestyle changes since beginning the bariatric program:  using bariatric diet pamphlet; eating lean protein and vegetables; eliminating sweets; eating yogurt instead of sweets. She must continue meeting with Ms. Botello to demonstrate the implementation of lifestyle changes prior to clearance for bariatric surgery.    Co-morbidities: HTN     Knowledge of surgery information:  - Basics of procedure: Y  - Risks: Y  - Basics of diet: Y    Pain: 0/10    Psychiatric Symptoms:   Depression - denied significant symptoms of depression.   Sera/Hypomania - denied significant symptoms of sera/hypomania.  Anxiety - Pt reported financial worries. She reported wanting to return to school but is anxious about financial windfall. She reported some worry about her health as well. Denied panic attacks.  Thoughts - denied delusions, hallucinations.  Suicidal thoughts/behaviors - denied.  Substance abuse - denied abuse or dependence.   Sleep - Toss and turn every once in a while  Self-injury - denied.    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 Pt continued using and finds that the medication helps her remain calmer.    Psychotherapy: Pt denied.    Treating clinicians: Dr. Claudio ([facility])    Health behaviors: Reported adherence to pharmacologic regimen.      Psychiatric history:   Previous diagnosis: Pt denied.    Previous hospitalizations: Pt denied.     History of outpatient treatment: Medication only (above)    Previous suicide attempt: Pt denied.      Trauma history:  Pt denied.       History of eating disorders:  History of bulimia: Pt denied.    History of binge-eating episodes: I used to do it when I was a teenager maybe. Denied binge eating in the last 10  years.      Family history of psychiatric illness: Pt denied.     Social history (marriage, employment, etc.): Ms. Del Castillo was born and raised in [place] by her mother. She moved to [place] around age 10 and has lived in [place] since. She has a little relationship with her biological father and a great relationship with her mother, who she works with. She denied childhood trauma, abuse, or neglect. She denied learning or behavioral problems in school. 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 Ms. Del Castillo has never  and is not in a romantic relationship. She has no children and lives with mother and brother. She reported good relationship with brother, who works at a [employer] warehouse.    Current psychosocial stressors: Work (short-staffed)    Report of coping skills: Stress eating at times  Hanging out with my friends or talking to them    Support system: Mother, brother, three close friends    Substance use:   Alcohol: Socially once every 1-3 months; denied history of abuse or dependency.   Drugs: Denied current use; denied history of abuse or dependency.  Tobacco: None.   Caffeine: 2 coffees/week. Cut out all sodas recently    Current medications and drug reactions (include OTC, herbal): see medication list     Strengths and liabilities: Strength: Patient accepts guidance/feedback, Strength: Patient is expressive/articulate., Strength: Patient is intelligent., Strength: Patient is motivated for change., Strength: Patient has positive support network., Strength: Patient has reasonable judgment., Strength: Patient is stable.     Current Evaluation:    Mental Status Exam:   General Appearance:  age appropriate, well dressed, neatly groomed, overweight    Speech:  normal tone, normal rate, normal pitch, normal volume    Level of Cooperation:  cooperative    Thought Processes:  normal and logical    Mood:  euthymic    Thought Content:   "normal, no suicidality, no homicidality, delusions, or paranoia    Affect:  congruent and appropriate    Orientation:  oriented x3    Memory:  recent memory intact; immediate and delayed word recall 3/3  remote memory intact; able to recall remote personal events   Attention Span & Concentration:  spelled "WORLD" forwards and backwards without errors   Fund of General Knowledge:  appropriate for education    Abstract Reasoning:  interpretation of proverbs was abstract; interpretation of similarities was abstract   Judgment & Insight:  good    Language  intact        Diagnostic Impression - Plan:      Diagnostic Impression:  Z01.818 Pre-operative examination   E66.01   Morbid obesity  I10          Hypertension  Z68.xx    Body Mass Index of 73.84    Plan:  Ms. Del Castillo completed psychological testing.  The report of this psychological evaluation will follow in the Notes folder in the patient's chart in the encounter titled Psychological Testing.  Overall impressions and recommendations will be included in the final report.    "

## 2023-10-18 NOTE — PSYCH TESTING
[facility] [address] [place] [address]  [phone]    REPORT OF PSYCHOLOGICAL TESTING    NAME: Juanita Del Castillo  MRN: [id number]  : 1995    REFERRED BY: Gabriel Vaz MD    REASON FOR REFERRAL:  Psychological Evaluation prior to bariatric surgery.    EVALUATED BY:  Nic Hill, Ph.D., Clinical Psychologist  BILL Abbasi, Psychometrician    DATES OF EVALUATION: 10/11/2023 and 10/16/2023    EVALUATION PROCEDURES AND TIMES:  Conducted by Psychologist (2 hours):  Integration of patient data, interpretation of standardized test results and clinical data, clinical decision-making, treatment planning and report, and interactive feedback to the patient  CPT Codes:  29051 - 1 hour; 35341 - 1 hour  Conducted by Technician (1.5 hours):  Psychological test administration and scoring by technician, two or more tests, any method:  [place] Multiphasic Personality Inventory - 2 - Restructured Form (MMPI-2-RF); [facility] Behavioral Medicine Diagnostic (MBMD)  CPT Codes:  48893 - 30 minutes; 60202 - 30 minutes, 60287 - 30 minutes, 96923 - 30 minutes, 77976 - 30 minutes    EVALUATION FINDINGS:  Before this evaluation was initiated, the purposes and process of the assessment and the limits of confidentiality were discussed with the patient who expressed understanding of these issues and orally consented to proceed with the evaluation.    Ms. Del Castillo has struggled with weight all [her] life.  Factors that have contributed to her weight gain over the years include: stress eating; eating while watching television; going out and trying new restaurants.   In addition, Ms. Del Castillo acknowledges that she is an emotional eater, with stress as her primary emotional trigger to overeat.  She is working on increasing her coping mechanisms for stress, including watching less television and exercising with friends at the gym.  She has tried many weight loss methods on her own (i.e. reducing intake; going  to the gym; Ozempic) with little success, and she believes that her biggest weight loss challenge is maintaining consistency.  Her motivation for seeking surgery now is improving her health and reducing her blood pressure.     Ms. Del Castillo has no history of significant psychiatric symptoms. She has never been in mental health treatment and never been diagnosed with a psychiatric condition. She denies a history of eating disorder.    At her initial consultation with Dr. Vaz, her BMI was 73.74 kg/m². Her medical comorbidities include: HTN. She has met multiple times with a bariatric dietician, and reports that she has made changes to her eating habits. She was aware of details regarding the Sleeve procedure, as well as risks of the procedure and post-operative eating restrictions. She appears motivated for change at this time. She was fully cooperative and engaged in the assessment process.    MMPI-2 RF.  The MMPI-2 RF provides an assessment of personality and psychopathology with specific evaluation of psychosocial risk factors associated with outcomes of bariatric surgery. Ms. Del Castillo produced an invalid MMPI profile due to excessive inconsistent responding. Therefore, her results are uninterpretable.    MBMB. The MBMD is a multidimensional assessment designed to identify psychosocial factors that may support or interfere with a patient's course of medical treatment. The categorizations below are based on credible probabilistic judgments and should not be considered definitive.    Negative Health Habits.  The following negative health habit is a likely problem area: Eating. A possible problem area is inactivity.      Psychiatric Indications.  Pt's profile indicates possible guardedness, anxiety-tension, and emotional lability. She may present as passively agreeable but may have covert distrust of medical professionals. She may experience intense, acute anxiety in response to health-related burdens, and a supportive  healthcare team that encourages her to express her fears will help her manage her anxiety.    Coping Styles.  Ms. Del Castillo may minimize her physical discomfort and may tell her healthcare team what she believes they want to hear. She may respond favorably to careful probing and explicit questions. A written treatment plan will help alleviate unhelpful fears and overreactions.    Stress Moderators.  Pt's profile indicates social isolation as a liability to her functioning and functional competence, future optimism, and spiritual melina as assets to her functioning.     Treatment Prognostics. Pt's profile indicates possible interventional fragility and information discomfort as liabilities and optimal compliance as an asset.     MBMD POST-SURGERY PROBABILITIES    Patient Behavior.  Ms. Petersons profile indicates high likelihood that she will change her unhealthy habits, follow nutritional advice, maintain an exercise program, maintain her postsurgical weight loss, and avoid long-term health complications; average likelihood that she will refrain from engaging in unhealthy eating behavior; and low likelihood that she will comply with a medical regimen.    Postsurgical [place].  Ms. Del Castillo's profile indicates good likelihood that surgery will improve her overall quality of life, body image, and employment/vocational opportunities. Her profile indicates average likelihood that surgery will improve her psychosocial functioning, physical health, mental outlook, and sexual activity.      Psychological factors revealed by MBMD profile are unlikely to contribute to excessive complications for this patient.       DIAGNOSTIC IMPRESSIONS:  Z68.43   Body Mass Index of 73.84  Z01.818 Pre-operative Exam  E66.01   Morbid Obesity    SUMMARY: Ms. Del Castillo has a long history of weight problems and is pursuing bariatric surgery at this time in an effort to improve her health and quality of life. Results of personality testing should be  considered valid, and they indicate no acute psychiatric symptoms or declines in functioning at this time that would impact her surgical prognosis. Test results do not reveal any evidence that psychological difficulties would play a role in her recovery from surgery. No overt psychological contraindications were revealed by psychological testing.

## 2023-11-03 ENCOUNTER — OFFICE VISIT (OUTPATIENT)
Dept: BARIATRICS | Facility: CLINIC | Age: 28
End: 2023-11-03
Payer: COMMERCIAL

## 2023-11-03 VITALS
HEIGHT: 70 IN | WEIGHT: 293 LBS | TEMPERATURE: 98 F | HEART RATE: 70 BPM | SYSTOLIC BLOOD PRESSURE: 164 MMHG | BODY MASS INDEX: 41.95 KG/M2 | RESPIRATION RATE: 16 BRPM | DIASTOLIC BLOOD PRESSURE: 81 MMHG

## 2023-11-03 DIAGNOSIS — E66.01 MORBID OBESITY: Primary | ICD-10-CM

## 2023-11-03 DIAGNOSIS — I10 ESSENTIAL HYPERTENSION: ICD-10-CM

## 2023-11-03 DIAGNOSIS — Z13.21 ENCOUNTER FOR VITAMIN DEFICIENCY SCREENING: ICD-10-CM

## 2023-11-03 DIAGNOSIS — G47.33 OSA (OBSTRUCTIVE SLEEP APNEA): ICD-10-CM

## 2023-11-03 PROCEDURE — 3008F BODY MASS INDEX DOCD: CPT | Mod: CPTII,S$GLB,, | Performed by: NURSE PRACTITIONER

## 2023-11-03 PROCEDURE — 99999 PR PBB SHADOW E&M-EST. PATIENT-LVL IV: CPT | Mod: PBBFAC,,, | Performed by: NURSE PRACTITIONER

## 2023-11-03 PROCEDURE — 3044F PR MOST RECENT HEMOGLOBIN A1C LEVEL <7.0%: ICD-10-PCS | Mod: CPTII,S$GLB,, | Performed by: NURSE PRACTITIONER

## 2023-11-03 PROCEDURE — 3077F PR MOST RECENT SYSTOLIC BLOOD PRESSURE >= 140 MM HG: ICD-10-PCS | Mod: CPTII,S$GLB,, | Performed by: NURSE PRACTITIONER

## 2023-11-03 PROCEDURE — 1160F PR REVIEW ALL MEDS BY PRESCRIBER/CLIN PHARMACIST DOCUMENTED: ICD-10-PCS | Mod: CPTII,S$GLB,, | Performed by: NURSE PRACTITIONER

## 2023-11-03 PROCEDURE — 3079F DIAST BP 80-89 MM HG: CPT | Mod: CPTII,S$GLB,, | Performed by: NURSE PRACTITIONER

## 2023-11-03 PROCEDURE — 1160F RVW MEDS BY RX/DR IN RCRD: CPT | Mod: CPTII,S$GLB,, | Performed by: NURSE PRACTITIONER

## 2023-11-03 PROCEDURE — 3044F HG A1C LEVEL LT 7.0%: CPT | Mod: CPTII,S$GLB,, | Performed by: NURSE PRACTITIONER

## 2023-11-03 PROCEDURE — 4010F PR ACE/ARB THEARPY RXD/TAKEN: ICD-10-PCS | Mod: CPTII,S$GLB,, | Performed by: NURSE PRACTITIONER

## 2023-11-03 PROCEDURE — 4010F ACE/ARB THERAPY RXD/TAKEN: CPT | Mod: CPTII,S$GLB,, | Performed by: NURSE PRACTITIONER

## 2023-11-03 PROCEDURE — 3079F PR MOST RECENT DIASTOLIC BLOOD PRESSURE 80-89 MM HG: ICD-10-PCS | Mod: CPTII,S$GLB,, | Performed by: NURSE PRACTITIONER

## 2023-11-03 PROCEDURE — 99203 PR OFFICE/OUTPT VISIT, NEW, LEVL III, 30-44 MIN: ICD-10-PCS | Mod: S$GLB,,, | Performed by: NURSE PRACTITIONER

## 2023-11-03 PROCEDURE — 1159F MED LIST DOCD IN RCRD: CPT | Mod: CPTII,S$GLB,, | Performed by: NURSE PRACTITIONER

## 2023-11-03 PROCEDURE — 99999 PR PBB SHADOW E&M-EST. PATIENT-LVL IV: ICD-10-PCS | Mod: PBBFAC,,, | Performed by: NURSE PRACTITIONER

## 2023-11-03 PROCEDURE — 99203 OFFICE O/P NEW LOW 30 MIN: CPT | Mod: S$GLB,,, | Performed by: NURSE PRACTITIONER

## 2023-11-03 PROCEDURE — 1159F PR MEDICATION LIST DOCUMENTED IN MEDICAL RECORD: ICD-10-PCS | Mod: CPTII,S$GLB,, | Performed by: NURSE PRACTITIONER

## 2023-11-03 PROCEDURE — 3077F SYST BP >= 140 MM HG: CPT | Mod: CPTII,S$GLB,, | Performed by: NURSE PRACTITIONER

## 2023-11-03 PROCEDURE — 3008F PR BODY MASS INDEX (BMI) DOCUMENTED: ICD-10-PCS | Mod: CPTII,S$GLB,, | Performed by: NURSE PRACTITIONER

## 2023-11-03 RX ORDER — MULTIVITAMIN
1 TABLET ORAL DAILY
COMMUNITY

## 2023-11-03 NOTE — PROGRESS NOTES
Medically Supervised Weight Loss Documentation    Chief Complaint   Patient presents with    Follow-up    Obesity    Nutrition Counseling       History of Present Illness:  Patient is a 28 y.o. female who is referred for evaluation of surgical treatment of morbid obesity. Patient has a Body mass index is 72.57 kg/m². kg/m².  She has known comorbidities of hypertension and obstructive sleep apnea. Patient has attended the bariatric seminar and is most interested in gastric sleeve surgery evaluation of surgical treatment of morbid obesity.     Pregnancy: Pt aware that is it not recommended to become pregnant for 18 month after bariatric surgery. She will be tested for pregnancy on the day of surgery. Contraceptives should be used. Contact OB/GYN for various methods and recommendations.       Comorbidities:   Past Medical History:   Diagnosis Date    HTN (hypertension)     NAJMA (obstructive sleep apnea)      Past Surgical History:   Procedure Laterality Date    TONSILLECTOMY       Family History   Problem Relation Age of Onset    Hypertension Mother     Obesity Mother     Obesity Father     Obesity Paternal Aunt     Breast cancer Paternal Grandmother     Ovarian cancer Neg Hx      Social History     Tobacco Use    Smoking status: Never    Smokeless tobacco: Never   Substance Use Topics    Alcohol use: Yes     Comment: once every 3 months    Drug use: Never        Review of patient's allergies indicates:   Allergen Reactions    Lisinopril      Lip swelling       Medications:  Current Outpatient Medications on File Prior to Visit   Medication Sig Dispense Refill    EScitalopram oxalate (LEXAPRO) 10 MG tablet Take 1 tablet (10 mg total) by mouth once daily. 90 tablet 1    losartan (COZAAR) 50 MG tablet Take 1 tablet (50 mg total) by mouth once daily. Needs office visit for further refills 90 tablet 1    multivitamin (ONE DAILY MULTIVITAMIN) per tablet Take 1 tablet by mouth once daily.      ondansetron (ZOFRAN-ODT) 4 MG  "TbDL Take 1 tablet (4 mg total) by mouth every 12 (twelve) hours as needed (nausea). 20 tablet 0    spironolactone (ALDACTONE) 50 MG tablet Take 1 tablet (50 mg total) by mouth once daily. 30 tablet 11    albuterol (PROAIR HFA) 90 mcg/actuation inhaler Inhale 2 puffs into the lungs every 6 (six) hours as needed for Shortness of Breath. Rescue 18 g 0     No current facility-administered medications on file prior to visit.         Body mass index is 72.57 kg/m².     Beginning Weight: 506 lbs    Last Weight: 507 lbs    Current Weight: 498 lbs   Weight Change: -8 lbs    Girth: 58"  Neck: 15"    Body comp:  Fat Percent:  59.4 %  Fat Mass:  296.2 lb  FFM:  202.4 lb  TBW: lb  TBW %:  %  BMR: 3109 kcal    Wt Readings from Last 5 Encounters:   11/03/23 (!) 226.2 kg (498 lb 9.6 oz)   10/06/23 (!) 230.1 kg (507 lb 4.5 oz)   10/03/23 (!) 229.8 kg (506 lb 9.9 oz)   08/18/23 (!) 223.5 kg (492 lb 11.2 oz)   07/14/23 (!) 220.1 kg (485 lb 3.2 oz)         Chart review:  Primary Care Physician: González      Lab review:  Most Recent Data:  CBC:   Lab Results   Component Value Date    WBC 7.79 01/05/2023    HGB 13.5 01/05/2023    HCT 41.9 01/05/2023     01/05/2023    MCV 88 01/05/2023    RDW 13.2 01/05/2023     BMP:   Lab Results   Component Value Date     08/02/2023    K 4.2 08/02/2023    CL 99 08/02/2023    CO2 31 08/02/2023    BUN 13 08/02/2023    CREATININE 0.87 08/02/2023     (H) 08/02/2023    CALCIUM 9.0 08/02/2023     LFTs:   Lab Results   Component Value Date    PROT 7.3 08/02/2023    ALBUMIN 3.8 08/02/2023    BILITOT 0.5 08/02/2023    AST 35 08/02/2023    ALKPHOS 84 08/02/2023    ALT 29 08/02/2023     Coags: No results found for: "INR", "PROTIME", "PTT"  FLP:   Lab Results   Component Value Date    CHOL 140 01/05/2023    HDL 61 01/05/2023    LDLCALC 68.2 01/05/2023    TRIG 54 01/05/2023    CHOLHDL 43.6 01/05/2023     DM:   Lab Results   Component Value Date    HGBA1C 5.4 01/05/2023    HGBA1C 5.4 06/17/2021 " "   LDLCALC 68.2 01/05/2023    CREATININE 0.87 08/02/2023     Thyroid:   Lab Results   Component Value Date    TSH 1.540 01/05/2023     Anemia: No results found for: "IRON", "TIBC", "FERRITIN", "AGNMTTIC02", "FOLATE"  Cardiac: No results found for: "TROPONINI", "CKTOTAL", "CKMB", "BNP"  Urinalysis: No results found for: "LABURIN", "COLORU", "PHUA", "CLARITYU", "SPECGRAV", "LABSPEC", "NITRITE", "PROTEINUR", "GLUCOSEU", "KETONESU", "UROBILINOGEN", "BILIRUBINUR", "BLOODU", "RBCU", "WBCUA"      Radiology review: Images independently reviewed and interpreted.    UGI- mild reflux/dysmotility      Other Results:  EKG (my interpretation): pending.        Review of Systems:  Review of Systems   Constitutional:  Positive for activity change.   All other systems reviewed and are negative.        Diet Education Discussed: Recommend high protein, low carb meals- mainly meats and vegetables.    Breakfast:  yogurt or protein shake  Lunch:  3-4 oz meats, 1/2 plate vegetables, fruit or other side  Dinner:  protein shake while at work  Water: 4 (40) oz  Primarily cooks  [employer] (packs) no carbonation  Limiting straws    MVI:   Equate MVI with iron      Exercise/Activity Discussed: recommend cardiovascular exercise, get HR over 100 for 20 minutes 3 times per week-   Gym 3-4x/wk, taking class circuits, upper body ([employer])- 30 minutes  Elliptical/treadmill- 10 minutes    Physical:     Vital Signs (Most Recent)  Temp: 97.5 °F (36.4 °C) (11/03/23 0927)  Pulse: 70 (11/03/23 0927)  Resp: 16 (11/03/23 0927)  BP: (!) 164/81 (11/03/23 0927)  5' 9.5" (1.765 m)  (!) 226.2 kg (498 lb 9.6 oz)     Physical Exam:  Physical Exam  Vitals and nursing note reviewed.   Constitutional:       General: She is not in acute distress.     Appearance: Normal appearance. She is obese. She is not ill-appearing or toxic-appearing.   HENT:      Head: Normocephalic and atraumatic.      Right Ear: External ear normal.      Left Ear: External ear normal.      " Nose: Nose normal. No congestion or rhinorrhea.      Mouth/Throat:      Mouth: Mucous membranes are moist.      Pharynx: Oropharynx is clear.   Eyes:      General:         Right eye: No discharge.         Left eye: No discharge.      Conjunctiva/sclera: Conjunctivae normal.   Cardiovascular:      Rate and Rhythm: Normal rate.      Pulses: Normal pulses.   Pulmonary:      Effort: Pulmonary effort is normal. No respiratory distress.      Breath sounds: No wheezing.   Musculoskeletal:         General: No swelling, tenderness, deformity or signs of injury. Normal range of motion.      Right lower leg: No edema.      Left lower leg: No edema.   Skin:     General: Skin is warm and dry.      Capillary Refill: Capillary refill takes less than 2 seconds.      Coloration: Skin is not jaundiced or pale.      Findings: No bruising, erythema or rash.   Neurological:      General: No focal deficit present.      Mental Status: She is alert and oriented to person, place, and time.      Motor: No weakness.      Gait: Gait normal.   Psychiatric:         Mood and Affect: Mood normal.         Behavior: Behavior normal.         Thought Content: Thought content normal.         Judgment: Judgment normal.       ASSESSMENT/PLAN:        1. Morbid obesity  Folate Serum    H. Pylori Antibody, IGG    Iron & TIBC    Lipid Profile    Magnesium    Phosphorus    T3    TSH    Free T4    Vitamin B12    Vitamin B1    Vitamin D 25 Hydroxy    FERRITIN      2. BMI 70 and over, adult        3. Essential hypertension  Lipid Profile      4. NAJMA (obstructive sleep apnea)        5. Encounter for vitamin deficiency screening  Folate Serum    Iron & TIBC    Vitamin B12    Vitamin B1    Vitamin D 25 Hydroxy    FERRITIN          Continue with Dr. Vaz's established plan on initial evaluation on 10/3/23       MSD 2/3     Patient will need:     Labs-  will need before sx  EKG-   UGI- done  dietary consult- done  psych consult- done  Seminar- done     Will obtain  the following clearances prior to surgery: none        Plan for this patient:  Falls reviewed with patient  Continue Prescribed home medications  Diet adherence   Doing well, making many changes  Tanita scale results reviewed with patient  Exercise/Activity adherence  Continue multivitamins- discussion of post-operative life long MVI need, including Calcium, and a B-Complex  preventative counseling-     Pregnancy: LMP 10/11/23- IUD-  Pt aware that is it not recommended to become pregnant for 18 month after bariatric surgery. She will be tested for pregnancy on the day of surgery. Contraceptives should be used. Contact OB/GYN for various methods and recommendations.       I spent a total of 30 minutes on the day of the visit.  This includes face to face time and non-face to face time preparing to see the patient (eg, review of tests), obtaining and/or reviewing separately obtained history, documenting clinical information in the electronic or other health record, independently interpreting results and communicating results to the patient/family/caregiver, or care coordinator.

## 2023-11-07 ENCOUNTER — PATIENT MESSAGE (OUTPATIENT)
Dept: BARIATRICS | Facility: CLINIC | Age: 28
End: 2023-11-07
Payer: COMMERCIAL

## 2023-11-08 DIAGNOSIS — E55.9 VITAMIN D INSUFFICIENCY: Primary | ICD-10-CM

## 2023-11-08 RX ORDER — ERGOCALCIFEROL 1.25 MG/1
50000 CAPSULE ORAL WEEKLY
Qty: 12 CAPSULE | Refills: 1 | Status: SHIPPED | OUTPATIENT
Start: 2023-11-08 | End: 2023-11-17 | Stop reason: SDUPTHER

## 2023-12-01 ENCOUNTER — OFFICE VISIT (OUTPATIENT)
Dept: BARIATRICS | Facility: CLINIC | Age: 28
End: 2023-12-01
Payer: COMMERCIAL

## 2023-12-01 VITALS
HEIGHT: 69 IN | HEART RATE: 87 BPM | RESPIRATION RATE: 16 BRPM | WEIGHT: 293 LBS | SYSTOLIC BLOOD PRESSURE: 169 MMHG | BODY MASS INDEX: 43.4 KG/M2 | TEMPERATURE: 97 F | DIASTOLIC BLOOD PRESSURE: 79 MMHG

## 2023-12-01 DIAGNOSIS — R19.8 INCREASED ABDOMINAL GIRTH: ICD-10-CM

## 2023-12-01 DIAGNOSIS — G47.33 OSA (OBSTRUCTIVE SLEEP APNEA): ICD-10-CM

## 2023-12-01 DIAGNOSIS — E66.01 MORBID OBESITY: Primary | ICD-10-CM

## 2023-12-01 DIAGNOSIS — I10 ESSENTIAL HYPERTENSION: ICD-10-CM

## 2023-12-01 PROCEDURE — 1159F PR MEDICATION LIST DOCUMENTED IN MEDICAL RECORD: ICD-10-PCS | Mod: CPTII,S$GLB,, | Performed by: NURSE PRACTITIONER

## 2023-12-01 PROCEDURE — 99999 PR PBB SHADOW E&M-EST. PATIENT-LVL V: CPT | Mod: PBBFAC,,, | Performed by: NURSE PRACTITIONER

## 2023-12-01 PROCEDURE — 3044F PR MOST RECENT HEMOGLOBIN A1C LEVEL <7.0%: ICD-10-PCS | Mod: CPTII,S$GLB,, | Performed by: NURSE PRACTITIONER

## 2023-12-01 PROCEDURE — 3078F DIAST BP <80 MM HG: CPT | Mod: CPTII,S$GLB,, | Performed by: NURSE PRACTITIONER

## 2023-12-01 PROCEDURE — 99999 PR PBB SHADOW E&M-EST. PATIENT-LVL V: ICD-10-PCS | Mod: PBBFAC,,, | Performed by: NURSE PRACTITIONER

## 2023-12-01 PROCEDURE — 3008F BODY MASS INDEX DOCD: CPT | Mod: CPTII,S$GLB,, | Performed by: NURSE PRACTITIONER

## 2023-12-01 PROCEDURE — 3077F PR MOST RECENT SYSTOLIC BLOOD PRESSURE >= 140 MM HG: ICD-10-PCS | Mod: CPTII,S$GLB,, | Performed by: NURSE PRACTITIONER

## 2023-12-01 PROCEDURE — 99215 OFFICE O/P EST HI 40 MIN: CPT | Mod: S$GLB,,, | Performed by: NURSE PRACTITIONER

## 2023-12-01 PROCEDURE — 3008F PR BODY MASS INDEX (BMI) DOCUMENTED: ICD-10-PCS | Mod: CPTII,S$GLB,, | Performed by: NURSE PRACTITIONER

## 2023-12-01 PROCEDURE — 1160F PR REVIEW ALL MEDS BY PRESCRIBER/CLIN PHARMACIST DOCUMENTED: ICD-10-PCS | Mod: CPTII,S$GLB,, | Performed by: NURSE PRACTITIONER

## 2023-12-01 PROCEDURE — 1160F RVW MEDS BY RX/DR IN RCRD: CPT | Mod: CPTII,S$GLB,, | Performed by: NURSE PRACTITIONER

## 2023-12-01 PROCEDURE — 3044F HG A1C LEVEL LT 7.0%: CPT | Mod: CPTII,S$GLB,, | Performed by: NURSE PRACTITIONER

## 2023-12-01 PROCEDURE — 99215 PR OFFICE/OUTPT VISIT, EST, LEVL V, 40-54 MIN: ICD-10-PCS | Mod: S$GLB,,, | Performed by: NURSE PRACTITIONER

## 2023-12-01 PROCEDURE — 3077F SYST BP >= 140 MM HG: CPT | Mod: CPTII,S$GLB,, | Performed by: NURSE PRACTITIONER

## 2023-12-01 PROCEDURE — 1159F MED LIST DOCD IN RCRD: CPT | Mod: CPTII,S$GLB,, | Performed by: NURSE PRACTITIONER

## 2023-12-01 PROCEDURE — 4010F PR ACE/ARB THEARPY RXD/TAKEN: ICD-10-PCS | Mod: CPTII,S$GLB,, | Performed by: NURSE PRACTITIONER

## 2023-12-01 PROCEDURE — 3078F PR MOST RECENT DIASTOLIC BLOOD PRESSURE < 80 MM HG: ICD-10-PCS | Mod: CPTII,S$GLB,, | Performed by: NURSE PRACTITIONER

## 2023-12-01 PROCEDURE — 4010F ACE/ARB THERAPY RXD/TAKEN: CPT | Mod: CPTII,S$GLB,, | Performed by: NURSE PRACTITIONER

## 2023-12-01 RX ORDER — SODIUM CHLORIDE 9 MG/ML
INJECTION, SOLUTION INTRAVENOUS CONTINUOUS
Status: CANCELLED | OUTPATIENT
Start: 2023-12-01

## 2023-12-01 RX ORDER — PANTOPRAZOLE SODIUM 40 MG/10ML
40 INJECTION, POWDER, LYOPHILIZED, FOR SOLUTION INTRAVENOUS DAILY
Status: CANCELLED | OUTPATIENT
Start: 2023-12-01

## 2023-12-01 RX ORDER — ENOXAPARIN SODIUM 300 MG/3ML
40 INJECTION INTRAVENOUS; SUBCUTANEOUS EVERY 12 HOURS
Status: CANCELLED | OUTPATIENT
Start: 2023-12-01

## 2023-12-01 NOTE — PROGRESS NOTES
Bariatric  History & Physical    SUBJECTIVE:     No chief complaint on file.      History of Present Illness:    Patient is a 28 y.o. female who is referred for evaluation of surgical treatment of morbid obesity. Her Body mass index is 72.73 kg/m². She has known comorbidities of hypertension and obstructive sleep apnea. She has attended the bariatric seminar and is most interested in gastric sleeve surgery.    Review of patient's allergies indicates:   Allergen Reactions    Lisinopril      Lip swelling       Past Medical History:   Diagnosis Date    HTN (hypertension)     NAJMA (obstructive sleep apnea)      Past Surgical History:   Procedure Laterality Date    TONSILLECTOMY       Family History   Problem Relation Age of Onset    Hypertension Mother     Obesity Mother     Obesity Father     Obesity Paternal Aunt     Breast cancer Paternal Grandmother     Ovarian cancer Neg Hx      Social History     Tobacco Use    Smoking status: Never    Smokeless tobacco: Never   Substance Use Topics    Alcohol use: Yes     Comment: once every 3 months    Drug use: Never        Current Outpatient Medications   Medication Sig Dispense Refill    ergocalciferol (VITAMIN D2) 50,000 unit Cap Take 1 capsule (50,000 Units total) by mouth once a week. 12 capsule 1    EScitalopram oxalate (LEXAPRO) 10 MG tablet Take 1 tablet (10 mg total) by mouth once daily. 90 tablet 1    losartan (COZAAR) 50 MG tablet Take 1 tablet (50 mg total) by mouth once daily. Needs office visit for further refills 90 tablet 1    multivitamin (ONE DAILY MULTIVITAMIN) per tablet Take 1 tablet by mouth once daily.      ondansetron (ZOFRAN-ODT) 4 MG TbDL Take 1 tablet (4 mg total) by mouth every 12 (twelve) hours as needed (nausea). 20 tablet 0    spironolactone (ALDACTONE) 50 MG tablet Take 1 tablet (50 mg total) by mouth once daily. 90 tablet 1     No current facility-administered medications for this visit.         Beginning Weight: 506  "lbs    Last Weight: 498 lbs    Current Weight: 485 lbs   Weight Change: 21 lbs    Body comp:  Fat Percent:  485 %  Fat Mass:  60.3 lb  FFM:  292.6 lb  TBW:  lb  TBW %:   %  BMR: 3040 kcal    Wt Readings from Last 10 Encounters:   12/01/23 (!) 220.2 kg (485 lb 6.4 oz)   11/17/23 (!) 222.1 kg (489 lb 9.6 oz)   11/03/23 (!) 226.2 kg (498 lb 9.6 oz)   10/06/23 (!) 230.1 kg (507 lb 4.5 oz)   10/03/23 (!) 229.8 kg (506 lb 9.9 oz)   08/18/23 (!) 223.5 kg (492 lb 11.2 oz)   07/14/23 (!) 220.1 kg (485 lb 3.2 oz)   04/17/23 (!) 223 kg (491 lb 11.2 oz)   03/16/23 (!) 225 kg (496 lb)   02/16/23 (!) 229.7 kg (506 lb 6.4 oz)         Diet Education Discussed:  Recommend high protein, low carb meals- mainly meats and vegetables.    Breakfast:  protein shake, eggs  Lunch:  salad with crust less pizza  Protein shake  Dinner:  salad with crustless pizza  Water: 64 oz  Approx 1300 calories per day  No straw    MVI:   Equate OTC Mvi with iron    Review of Systems:  Review of Systems   Constitutional:  Positive for activity change.   All other systems reviewed and are negative.    OBJECTIVE:     Vital Signs (Most Recent)  Temp: 97.2 °F (36.2 °C) (12/01/23 0937)  Pulse: 87 (12/01/23 0937)  Resp: 16 (12/01/23 0937)  BP: (!) 169/79 (12/01/23 0937)  5' 8.5" (1.74 m)  (!) 220.2 kg (485 lb 6.4 oz)       Chart review:  Primary Care Physician: González      Lab review:  Most Recent Data:  CBC:   Lab Results   Component Value Date    WBC 6.67 11/08/2023    HGB 12.7 11/08/2023    HCT 39.2 11/08/2023     11/08/2023    MCV 87 11/08/2023    RDW 13.1 11/08/2023     BMP:   Lab Results   Component Value Date     11/08/2023    K 4.7 11/08/2023     11/08/2023    CO2 29 11/08/2023    BUN 16 11/08/2023    CREATININE 0.74 11/08/2023     11/08/2023    CALCIUM 9.6 11/08/2023    MG 1.9 11/08/2023    PHOS 3.6 11/08/2023     LFTs:   Lab Results   Component Value Date    PROT 7.6 11/08/2023    ALBUMIN 4.1 11/08/2023    BILITOT 0.6 " "11/08/2023    AST 28 11/08/2023    ALKPHOS 76 11/08/2023    ALT 21 11/08/2023     Coags: No results found for: "INR", "PROTIME", "PTT"  FLP:   Lab Results   Component Value Date    CHOL 136 11/08/2023    CHOL 136 11/08/2023    HDL 56 11/08/2023    HDL 56 11/08/2023    LDLCALC 67.8 11/08/2023    LDLCALC 67.8 11/08/2023    TRIG 61 11/08/2023    TRIG 61 11/08/2023    CHOLHDL 41.2 11/08/2023    CHOLHDL 41.2 11/08/2023     DM:   Lab Results   Component Value Date    HGBA1C 5.3 11/08/2023    HGBA1C 5.4 01/05/2023    HGBA1C 5.4 06/17/2021    LDLCALC 67.8 11/08/2023    LDLCALC 67.8 11/08/2023    CREATININE 0.74 11/08/2023     Thyroid:   Lab Results   Component Value Date    TSH 2.190 11/08/2023    TSH 2.190 11/08/2023    FREET4 1.22 11/08/2023    L8AMXNI 105 11/08/2023     Anemia:   Lab Results   Component Value Date    IRON 96 11/08/2023    TIBC 352 11/08/2023    FERRITIN 38 11/08/2023    [id number] 685 11/08/2023    FOLATE 12.4 11/08/2023     Cardiac: No results found for: "TROPONINI", "CKTOTAL", "CKMB", "BNP"  Urinalysis: No results found for: "LABURIN", "COLORU", "PHUA", "CLARITYU", "SPECGRAV", "LABSPEC", "NITRITE", "PROTEINUR", "GLUCOSEU", "KETONESU", "UROBILINOGEN", "BILIRUBINUR", "BLOODU", "RBCU", "WBCUA"      Radiology review: Images independently reviewed and interpreted.    UGI-   Mild esophageal dysmotility.     Mild gastroesophageal reflux.      Other Results:  EKG (my interpretation):   normal EKG, normal sinus rhythm, unchanged from previous tracings.    Physical Exam:  Physical Exam  Vitals and nursing note reviewed.   Constitutional:       General: She is not in acute distress.     Appearance: Normal appearance. She is obese. She is not ill-appearing or toxic-appearing.   HENT:      Head: Normocephalic and atraumatic.      Right Ear: External ear normal.      Left Ear: External ear normal.      Nose: Nose normal. No congestion or rhinorrhea.      Mouth/Throat:      Mouth: Mucous membranes are moist.      " "Pharynx: Oropharynx is clear.   Eyes:      General:         Right eye: No discharge.         Left eye: No discharge.      Conjunctiva/sclera: Conjunctivae normal.   Cardiovascular:      Rate and Rhythm: Normal rate.      Pulses: Normal pulses.   Pulmonary:      Effort: Pulmonary effort is normal. No respiratory distress.      Breath sounds: No wheezing.   Abdominal:      General: Abdomen is flat. There is no distension.      Palpations: Abdomen is soft. There is no mass.      Tenderness: There is no abdominal tenderness.      Hernia: No hernia is present.   Musculoskeletal:         General: No swelling, tenderness, deformity or signs of injury. Normal range of motion.      Right lower leg: No edema.      Left lower leg: No edema.   Skin:     General: Skin is warm and dry.      Capillary Refill: Capillary refill takes less than 2 seconds.      Coloration: Skin is not jaundiced or pale.      Findings: No bruising, erythema or rash.   Neurological:      General: No focal deficit present.      Mental Status: She is alert and oriented to person, place, and time.      Motor: No weakness.      Gait: Gait normal.   Psychiatric:         Mood and Affect: Mood normal.         Behavior: Behavior normal.         Thought Content: Thought content normal.         Judgment: Judgment normal.     ASSESSMENT/PLAN:        Labs: done  UGI: done  EKG:  done  Psych consult: done  Dietary consult: done  Seminar: done    Will obtain the following clearances prior to surgery: none      1. Morbid obesity        2. BMI 70 and over, adult        3. Essential hypertension        4. NAJMA (obstructive sleep apnea)        5. Increased abdominal girth      58"              Plan:  Juanita Del Castillo has morbid obesity as their Body mass index is 72.73 kg/m². She would benefit from weight loss surgery and has chosen gastric sleeve surgery as the preferred procedure. She understands that this is a tool and lifestyle change will be necessary to keep weight " "off. I went over possible complications of the chosen surgery with the patient and she is agreeable to surgery.    She has been instructed to start the pre operative diet.   Pre op Diet    Breakfast- protein shake  Lunch- protein shake  Dinner- 3 ounces of meat and vegetables (from list previously given)  NO SNACKING  Only water to drink     She surgical date is December 20, 2023 at [facility]      The patient has been taught the post operative diet and understands that she will need to stay on this diet to prevent complication.  They are aware of the post operative follow up and return to see me after surgery to treat/prevent/identify any complications.  she has been advised to attend support groups post operatively.     Pregnancy: Pt aware that is it not recommended to become pregnant for 18 month after bariatric surgery. LMP November 10, 2023. She will be tested for pregnancy on the day of surgery. Contraceptives should be used. Contact OB/GYN for various methods and recommendations.    GIRTH 56"       "

## 2023-12-01 NOTE — PATIENT INSTRUCTIONS
Pre op Diet **Start 12/6/23**    Breakfast- protein shake  Lunch- protein shake  Dinner- 3 ounces of meat and vegetables (from list)    NO SNACKING  Only water to drink     Vitamins stop 1 week before surgery and restart 1 week after  Colace twice per day    Jesse    [phone] office fax

## 2023-12-07 ENCOUNTER — PATIENT MESSAGE (OUTPATIENT)
Dept: BARIATRICS | Facility: CLINIC | Age: 28
End: 2023-12-07
Payer: COMMERCIAL

## 2023-12-11 ENCOUNTER — PATIENT MESSAGE (OUTPATIENT)
Dept: BARIATRICS | Facility: CLINIC | Age: 28
End: 2023-12-11
Payer: COMMERCIAL

## 2023-12-15 ENCOUNTER — CLINICAL SUPPORT (OUTPATIENT)
Dept: BARIATRICS | Facility: CLINIC | Age: 28
End: 2023-12-15
Payer: COMMERCIAL

## 2023-12-15 DIAGNOSIS — E66.01 MORBID OBESITY: Primary | ICD-10-CM

## 2023-12-15 PROCEDURE — 99999 PR PBB SHADOW E&M-EST. PATIENT-LVL I: ICD-10-PCS | Mod: PBBFAC,,,

## 2023-12-15 PROCEDURE — 99499 UNLISTED E&M SERVICE: CPT | Mod: S$GLB,,, | Performed by: SURGERY

## 2023-12-15 PROCEDURE — 99499 NO LOS: ICD-10-PCS | Mod: S$GLB,,, | Performed by: SURGERY

## 2023-12-15 PROCEDURE — 99999 PR PBB SHADOW E&M-EST. PATIENT-LVL I: CPT | Mod: PBBFAC,,,

## 2023-12-15 NOTE — PROGRESS NOTES
Confirmed with patient that sheis having a sleeve gastrectomy on  12/20/2023 at [facility].    Weight 471.0 lb  Fat%  59.8  Fat mass 281.6 lb   .4 lb    BMI  68.6        Preop diet reviewed with patient.   Reminded of liquids only (water and protein shakes) on the day before surgery.   Reviewed progression of diet after surgery    ~Clear liquid diet for the 1st week post op with a goal of 64oz of fluid intake daily  ~encouraged protein intake to facilitate the healing process, this can include protein erickson, gatorade zero with protein, protein broth, etc  ~avoid food/liquids with temperature extreme of too hot or too cold, as this may cause spasms of the stomach leading to increased pain and complications with the healing process  ~Full liquid diet for weeks 2 and 3 post-op with a goal of 60 gm of protein daily   ~begin bariatric vitamins on 1st day of post-op week 2  ~emphasized smooth, liquid consistency of intake. Any chunks or bits of food may cause complications of healing.   Reviewed the importance of  ~post op activity as tolerated  ~ incentive spirometry as provided by the respiratory therapist at the hospital   ~adequate oral fluid intake/hydration  ~written copy of ambulation, and hydration tracking tool provided  Reviewed post-op wound/incision care (written copy provided)   ~1st shower 48 hours after surgery and then daily, no rubbing or scrubbing and pat to dry   ~no tub baths, swimming pool, hot tub until cleared at 2 week post-op visit   ~may remove outer bandage in 1st shower, steri-strips remain until they fall off  ~educated patient of signs and symptoms of infection and importance of  reporting them (redness, swelling, drainage, fever)  List of approved post-op over-the-counter meds provided   ~emphasized NO NSAIDS  Reviewed post-op constipation protocol, written copy provided.  Instructed of post-operative limitations   ~no driving if taking narcotic pain medication.    ~no lifting >5 lbs for 2  weeks, >30 lbs for 6 weeks post-op    Patient verbalized complete understanding.    Patient completed Nutrition Direct form, will fax per pt request.

## 2024-01-05 ENCOUNTER — CLINICAL SUPPORT (OUTPATIENT)
Dept: BARIATRICS | Facility: CLINIC | Age: 29
End: 2024-01-05
Payer: MEDICAID

## 2024-01-05 ENCOUNTER — OFFICE VISIT (OUTPATIENT)
Dept: BARIATRICS | Facility: CLINIC | Age: 29
End: 2024-01-05
Payer: MEDICAID

## 2024-01-05 VITALS
WEIGHT: 293 LBS | TEMPERATURE: 98 F | RESPIRATION RATE: 16 BRPM | HEIGHT: 70 IN | BODY MASS INDEX: 41.95 KG/M2 | SYSTOLIC BLOOD PRESSURE: 138 MMHG | HEART RATE: 66 BPM | DIASTOLIC BLOOD PRESSURE: 80 MMHG

## 2024-01-05 DIAGNOSIS — I10 ESSENTIAL HYPERTENSION: ICD-10-CM

## 2024-01-05 DIAGNOSIS — Z98.84 S/P BARIATRIC SURGERY: Primary | ICD-10-CM

## 2024-01-05 DIAGNOSIS — G47.33 OSA (OBSTRUCTIVE SLEEP APNEA): ICD-10-CM

## 2024-01-05 DIAGNOSIS — Z71.3 ENCOUNTER FOR DIETARY COUNSELING AND SURVEILLANCE: Primary | ICD-10-CM

## 2024-01-05 DIAGNOSIS — E66.01 MORBID OBESITY: ICD-10-CM

## 2024-01-05 PROCEDURE — 3079F DIAST BP 80-89 MM HG: CPT | Mod: CPTII,S$GLB,, | Performed by: NURSE PRACTITIONER

## 2024-01-05 PROCEDURE — 99999 PR PBB SHADOW E&M-EST. PATIENT-LVL III: CPT | Mod: PBBFAC,,,

## 2024-01-05 PROCEDURE — 99999 PR PBB SHADOW E&M-EST. PATIENT-LVL III: CPT | Mod: PBBFAC,,, | Performed by: NURSE PRACTITIONER

## 2024-01-05 PROCEDURE — 1159F MED LIST DOCD IN RCRD: CPT | Mod: CPTII,S$GLB,, | Performed by: NURSE PRACTITIONER

## 2024-01-05 PROCEDURE — 3075F SYST BP GE 130 - 139MM HG: CPT | Mod: CPTII,S$GLB,, | Performed by: NURSE PRACTITIONER

## 2024-01-05 PROCEDURE — 1160F RVW MEDS BY RX/DR IN RCRD: CPT | Mod: CPTII,S$GLB,, | Performed by: NURSE PRACTITIONER

## 2024-01-05 PROCEDURE — 99213 OFFICE O/P EST LOW 20 MIN: CPT | Mod: PBBFAC,PO

## 2024-01-05 PROCEDURE — 99024 POSTOP FOLLOW-UP VISIT: CPT | Mod: S$GLB,,, | Performed by: NURSE PRACTITIONER

## 2024-01-05 NOTE — PROGRESS NOTES
Bariatric Surgery Post Op Note    Surgery: gastric sleeve surgery  Date: 12/20/23  Initial weight: 506 lbs  Last weight: 476 lbs  Current weight: 453 lbs    Wt Readings from Last 10 Encounters:   01/05/24 (!) 205.8 kg (453 lb 12.8 oz)   12/20/23 (!) 216.2 kg (476 lb 10.2 oz)   12/15/23 (!) 216.2 kg (476 lb 10.2 oz)   12/01/23 (!) 220.2 kg (485 lb 6.4 oz)   11/17/23 (!) 222.1 kg (489 lb 9.6 oz)   11/03/23 (!) 226.2 kg (498 lb 9.6 oz)   10/06/23 (!) 230.1 kg (507 lb 4.5 oz)   10/03/23 (!) 229.8 kg (506 lb 9.9 oz)   08/18/23 (!) 223.5 kg (492 lb 11.2 oz)   07/14/23 (!) 220.1 kg (485 lb 3.2 oz)         Comorbidities:   Past Medical History:   Diagnosis Date    Asthma     HTN (hypertension)     NAJMA (obstructive sleep apnea)      Past Surgical History:   Procedure Laterality Date    LAPAROSCOPIC SLEEVE GASTRECTOMY N/A 12/20/2023    Procedure: GASTRECTOMY, SLEEVE, LAPAROSCOPIC;  Surgeon: Gabriel Vaz MD;  Location: [facility];  Service: General;  Laterality: N/A;    TONSILLECTOMY       Family History   Problem Relation Age of Onset    Hypertension Mother     Obesity Mother     Obesity Father     Obesity Paternal Aunt     Breast cancer Paternal Grandmother     Ovarian cancer Neg Hx      Social History     Tobacco Use    Smoking status: Never    Smokeless tobacco: Never   Substance Use Topics    Alcohol use: Yes     Comment: once every 3 months    Drug use: Never          Medications:  Current Outpatient Medications on File Prior to Visit   Medication Sig Dispense Refill    ALBUTEROL INHL Inhale 2 puffs into the lungs as needed.      docusate sodium (COLACE) 50 MG capsule Take 100 mg by mouth once daily.      enoxaparin (LOVENOX) 40 mg/0.4 mL Syrg Inject 0.4 mLs (40 mg total) into the skin once daily. 12 mL 0    ergocalciferol (VITAMIN D2) 50,000 unit Cap Take 1 capsule (50,000 Units total) by mouth once a week. 12 capsule 1    EScitalopram oxalate (LEXAPRO) 10 MG tablet Take 1 tablet (10 mg total) by mouth once daily.  "90 tablet 1    HYDROcodone-acetaminophen (NORCO) 7.5-325 mg per tablet Take 1 tablet by mouth every 4 (four) hours as needed for Pain. 20 tablet 0    losartan (COZAAR) 50 MG tablet Take 1 tablet (50 mg total) by mouth once daily. Needs office visit for further refills 90 tablet 1    multivitamin (ONE DAILY MULTIVITAMIN) per tablet Take 1 tablet by mouth once daily.      ondansetron (ZOFRAN-ODT) 4 MG TbDL Take 1 tablet (4 mg total) by mouth every 6 (six) hours as needed (nv). 30 tablet 0     No current facility-administered medications on file prior to visit.         Labs: individually reviewed and interpreted.  Most Recent Data:  CBC:   Lab Results   Component Value Date    WBC 9.36 12/21/2023    HGB 11.5 (L) 12/21/2023    HCT 35.1 (L) 12/21/2023     12/21/2023    MCV 86 12/21/2023    RDW 13.0 12/21/2023     BMP:   Lab Results   Component Value Date     (L) 12/21/2023    K 4.3 12/21/2023    CL 99 12/21/2023    CO2 26 12/21/2023    BUN 7 12/21/2023    CREATININE 0.66 12/21/2023     (H) 12/21/2023    CALCIUM 8.8 12/21/2023    MG 1.7 12/21/2023    PHOS 3.6 12/21/2023     LFTs:   Lab Results   Component Value Date    PROT 7.6 11/08/2023    ALBUMIN 4.1 11/08/2023    BILITOT 0.6 11/08/2023    AST 28 11/08/2023    ALKPHOS 76 11/08/2023    ALT 21 11/08/2023     Coags: No results found for: "INR", "PROTIME", "PTT"  FLP:   Lab Results   Component Value Date    CHOL 136 11/08/2023    CHOL 136 11/08/2023    HDL 56 11/08/2023    HDL 56 11/08/2023    LDLCALC 67.8 11/08/2023    LDLCALC 67.8 11/08/2023    TRIG 61 11/08/2023    TRIG 61 11/08/2023    CHOLHDL 41.2 11/08/2023    CHOLHDL 41.2 11/08/2023     DM:   Lab Results   Component Value Date    HGBA1C 5.3 11/08/2023    HGBA1C 5.4 01/05/2023    HGBA1C 5.4 06/17/2021    LDLCALC 67.8 11/08/2023    LDLCALC 67.8 11/08/2023    CREATININE 0.66 12/21/2023     Thyroid:   Lab Results   Component Value Date    TSH 2.190 11/08/2023    TSH 2.190 11/08/2023    FREET4 1.22 " "11/08/2023    [id number] 105 11/08/2023     Anemia:   Lab Results   Component Value Date    IRON 96 11/08/2023    TIBC 352 11/08/2023    FERRITIN 38 11/08/2023    [id number] 685 11/08/2023    FOLATE 12.4 11/08/2023     Cardiac: No results found for: "TROPONINI", "CKTOTAL", "CKMB", "BNP"  Urinalysis: No results found for: "LABURIN", "COLORU", "PHUA", "CLARITYU", "SPECGRAV", "LABSPEC", "NITRITE", "PROTEINUR", "GLUCOSEU", "KETONESU", "UROBILINOGEN", "BILIRUBINUR", "BLOODU", "RBCU", "WBCUA"          Review of Systems:  Review of Systems   Constitutional:  Positive for activity change. Negative for unexpected weight change.   All other systems reviewed and are negative.        IV Infusions: none  Ed Visits or readmissions: none  Constipation: stool softeners q day  Reflux: none  Vomiting: none      Body comp:  Fat Percent:  60.8 %  Fat Mass:  276.0 lb  FFM:  177.8 lb  TBW:  lb  TBW %:   %  BMR: 2792 kcal      Wt Readings from Last 10 Encounters:   01/05/24 (!) 205.8 kg (453 lb 12.8 oz)   12/20/23 (!) 216.2 kg (476 lb 10.2 oz)   12/15/23 (!) 216.2 kg (476 lb 10.2 oz)   12/01/23 (!) 220.2 kg (485 lb 6.4 oz)   11/17/23 (!) 222.1 kg (489 lb 9.6 oz)   11/03/23 (!) 226.2 kg (498 lb 9.6 oz)   10/06/23 (!) 230.1 kg (507 lb 4.5 oz)   10/03/23 (!) 229.8 kg (506 lb 9.9 oz)   08/18/23 (!) 223.5 kg (492 lb 11.2 oz)   07/14/23 (!) 220.1 kg (485 lb 3.2 oz)         Diet:  Fluids: 64 oz once  Protein shake: fairlife, premier  Water: 1-2 bottles    Exercise:  Walking TID to mailbox    MVI:   Baripal  Calcium chews TID        Physical Exam:  Vitals:    01/05/24 0934   BP: 138/80   Pulse: 66   Resp: 16   Temp: 97.6 °F (36.4 °C)       Physical Exam  Vitals and nursing note reviewed.   Constitutional:       General: She is not in acute distress.     Appearance: Normal appearance. She is obese. She is not ill-appearing or toxic-appearing.   HENT:      Head: Normocephalic and atraumatic.      Right Ear: External ear normal.      Left Ear: External " ear normal.      Nose: Nose normal. No congestion or rhinorrhea.      Mouth/Throat:      Mouth: Mucous membranes are moist.      Pharynx: Oropharynx is clear.   Eyes:      General:         Right eye: No discharge.         Left eye: No discharge.      Conjunctiva/sclera: Conjunctivae normal.   Cardiovascular:      Rate and Rhythm: Normal rate.      Pulses: Normal pulses.   Pulmonary:      Effort: Pulmonary effort is normal. No respiratory distress.      Breath sounds: No wheezing.   Abdominal:      General: Abdomen is flat. There is no distension.      Palpations: Abdomen is soft. There is no mass.      Tenderness: There is no abdominal tenderness.      Hernia: No hernia is present.   Musculoskeletal:         General: No swelling, tenderness, deformity or signs of injury. Normal range of motion.      Right lower leg: No edema.      Left lower leg: No edema.   Skin:     General: Skin is warm and dry.      Capillary Refill: Capillary refill takes less than 2 seconds.      Coloration: Skin is not jaundiced or pale.      Findings: No bruising, erythema or rash.   Neurological:      General: No focal deficit present.      Mental Status: She is alert and oriented to person, place, and time.      Motor: No weakness.      Gait: Gait normal.   Psychiatric:         Mood and Affect: Mood normal.         Behavior: Behavior normal.         Thought Content: Thought content normal.         Judgment: Judgment normal.         Wound/Incision:  clean, dry, intact, no drainage, healed      A/P: s/p Sleeve  Reviewed Surgical images with pt     Counseling/Plan for patient:  Falls reviewed with patient  Continue prescribed medications  Tanita scale results reviewed with patient  Fat  Muscle (FFM)  Water   Diet adherence  Ensure 60 gm of protein at minimum/day  Do not skip meals    Exercise: ok for cardio, no heavy lifting over 30 pounds for another month  Vitamins: 2 mvi daily, calcium, and b complex  Continue multivitamins- discussion of  post-operative life long MVI need, including Calcium, and a B-Complex  preventative counseling-LMP 12/17  Pregnancy: Pt aware that is it not recommended to become pregnant for 18 month after bariatric surgery. Contraceptives should be used. Contact OB/GYN for various methods and recommendations.            Co morbidities:     1. S/P bariatric surgery        2. BMI 70 and over, adult        3. Essential hypertension        4. NAJMA (obstructive sleep apnea)            This includes face to face time and non-face to face time preparing to see the patient (eg, review of tests), obtaining and/or reviewing separately obtained history, documenting clinical information in the electronic or other health record, independently interpreting results and communicating results to the patient/family/caregiver, or care coordinator.

## 2024-01-08 VITALS — BODY MASS INDEX: 41.95 KG/M2 | HEIGHT: 70 IN | WEIGHT: 293 LBS

## 2024-01-08 NOTE — PROGRESS NOTES
NUTRITION NOTE    Referring Physician: Dr. Vaz  Reason for MNT Referral: Follow-up 2 Weeks s/p Gastric Sleeve    PAST MEDICAL HISTORY:  Denies nausea, vomiting, constipation, and diarrhea.  Reports doing well.    Past Medical History:   Diagnosis Date    Asthma     HTN (hypertension)     NAJMA (obstructive sleep apnea)        CLINICAL DATA:  28 y.o. female.    There were no vitals filed for this visit.    Current Weight: 453 lbs  BMI: 65.94  Total Weight Loss: -53 lbs    LABS:  Reviewed from 12/21.    CURRENT DIET:  Bariatric Liquid Diet    Diet Recall: 60-70 grams of protein/day; 50-60 oz of fluids/day.     Diet Includes: protein shake, creamy soups with unflavored protein powder, SF jello/pudding/popsicles/fudgsicles, oikos greek yogurt, protein erickson  Protein Supplements: Fairlife, Premier, Gatorade Fit, or Isopure powder  Beverages: 1-2 water bottles, body armour light, propel, gatorade zero    EXERCISE:  Walking to and from house x3    Restrictions to Exercise:  ok for cardio, no heavy lifting over 30 pounds for another month    VITAMINS/MINERALS:  Multivitamins: Bariatric pal in AM  B-Complex: Included with multivitamin.  Calcium Citrate + Vitamin D: chewables 500mg x3/d, Am, 12, 8pm  Vitamin B12:  Included with multivitamin    Reviewed importance of  calcium and iron sources to ensure improved absorption.     ASSESSMENT:  Doing fairly well overall.  Adequate protein intake.  Adequate fluid intake. Reached 64oz for first time on 1/4.     BARIATRIC DIET DISCUSSION:  Instructed and provided written materials on bariatric liquid diet plan.  Reinforced post-op nutrition guidelines.    PLAN/RECOMMONDATIONS:  Continue bariatric liquid diet.  Maintain protein intake.  Maintain fluid intake.  Begin tracking to ensure 60g protein and 64oz daily.   Increase light exercise.  Continue appropriate vitamins & minerals.  Adjust vitamins & minerals by: Ensure iron and calcium sources are  by 2 hours.  Continued use of 3 calcium chews/d to meet 1200mg calcium.     Return to clinic in 4 weeks.    SESSION TIME: 30 minutes

## 2024-01-25 ENCOUNTER — PATIENT MESSAGE (OUTPATIENT)
Dept: BARIATRICS | Facility: CLINIC | Age: 29
End: 2024-01-25
Payer: COMMERCIAL

## 2024-01-26 ENCOUNTER — TELEPHONE (OUTPATIENT)
Dept: BARIATRICS | Facility: CLINIC | Age: 29
End: 2024-01-26
Payer: COMMERCIAL

## 2024-01-26 NOTE — TELEPHONE ENCOUNTER
called and spoke to pt and told her to have them resend the RTW and short term disability paperwork and we would ask the doctor for an extension. Pt agreed and understood

## 2024-01-30 ENCOUNTER — PATIENT MESSAGE (OUTPATIENT)
Dept: BARIATRICS | Facility: CLINIC | Age: 29
End: 2024-01-30
Payer: COMMERCIAL

## 2024-02-05 ENCOUNTER — PATIENT MESSAGE (OUTPATIENT)
Dept: BARIATRICS | Facility: CLINIC | Age: 29
End: 2024-02-05
Payer: COMMERCIAL

## 2024-02-08 ENCOUNTER — TELEPHONE (OUTPATIENT)
Dept: BARIATRICS | Facility: CLINIC | Age: 29
End: 2024-02-08
Payer: COMMERCIAL

## 2024-02-08 NOTE — TELEPHONE ENCOUNTER
Returned call to Arleen at [facility]. No answer, call was sent to Arleen's confidential voice mailbox. Left message stating that the pt's return to work date should be 2/1/24 (6 weeks after surgery). Left call back numbers to my direct line and to the general line [phone].     ----- Message from Seth Monk sent at 2/8/2024 10:11 AM CST -----  Regarding: advice  Type: Needs Medical Advice  Who Called:  Arleen from   Symptoms (please be specific):    How long has patient had these symptoms:    Pharmacy name and phone #:    Best Call Back Number: [phone] ext [phone]  Additional Information: She has her short term disability claim. She would like to now if she can go back 02/01 and the pt  stated that she can re tern on 02/12. Please call to advice thanks

## 2024-02-09 ENCOUNTER — OFFICE VISIT (OUTPATIENT)
Dept: BARIATRICS | Facility: CLINIC | Age: 29
End: 2024-02-09
Payer: COMMERCIAL

## 2024-02-09 VITALS
RESPIRATION RATE: 16 BRPM | SYSTOLIC BLOOD PRESSURE: 136 MMHG | HEART RATE: 68 BPM | WEIGHT: 293 LBS | BODY MASS INDEX: 41.95 KG/M2 | DIASTOLIC BLOOD PRESSURE: 83 MMHG | HEIGHT: 70 IN | TEMPERATURE: 97 F

## 2024-02-09 DIAGNOSIS — E66.01 MORBID OBESITY: Primary | ICD-10-CM

## 2024-02-09 PROCEDURE — 99999 PR PBB SHADOW E&M-EST. PATIENT-LVL IV: CPT | Mod: PBBFAC,,, | Performed by: SURGERY

## 2024-02-09 PROCEDURE — 1159F MED LIST DOCD IN RCRD: CPT | Mod: CPTII,S$GLB,, | Performed by: SURGERY

## 2024-02-09 PROCEDURE — 3079F DIAST BP 80-89 MM HG: CPT | Mod: CPTII,S$GLB,, | Performed by: SURGERY

## 2024-02-09 PROCEDURE — 99024 POSTOP FOLLOW-UP VISIT: CPT | Mod: S$GLB,,, | Performed by: SURGERY

## 2024-02-09 PROCEDURE — 3075F SYST BP GE 130 - 139MM HG: CPT | Mod: CPTII,S$GLB,, | Performed by: SURGERY

## 2024-02-09 NOTE — PROGRESS NOTES
Post Op Note    Surgery: gastric sleeve surgery  Date: 12/20/23  Initial weight: 506  Last weight: 506  Current weight: 451    Constipation: none  Reflux: none  Vomiting: none    Diet:    Doing well with choices     Exercise:    Going to gym  Starting with   2-3 times per week doing classes    MVI: taurus mvi, calcium    Vitals:    02/09/24 1137   BP: 136/83   Pulse: 68   Resp: 16   Temp: 97.3 °F (36.3 °C)       Body comp:  Fat Percent:  58.7 %  Fat Mass:  265 lb  FFM:  186 lb  BMR: 2899 kcal    PE:  NAD  RRR  Soft/nt/nd  Incisions: well healed    Labs: non    A/P: s/p sleeve     Counseling for patient:    Diet: continue diet plan  Exercise: no lifting over 30 pounds, ok for cardio  Vitamins: taurus bxo, patricio  Co morbidities:     1. Morbid obesity  CBC w/ Auto Differential    CMP    B12    B1    Lipid Panel    Iron Studies          -All above: Evaluated, monitored, and treated with diet and exercise program.

## 2024-02-09 NOTE — LETTER
February 9, 2024      [facility]  [address]  [place] [address]  Phone: [phone]  Fax: [phone]       Patient: Juanita Del Castillo   YOB: 1995  Date of Visit: 02/09/2024    To Whom It May Concern:    Evan Del Castillo  was at [facility] on 02/09/2024. The patient may return to work with no restrictions. If you have any questions or concerns, or if I can be of further assistance, please do not hesitate to contact me.    Sincerely,    HEMANT Vaz MD

## 2024-03-26 ENCOUNTER — TELEPHONE (OUTPATIENT)
Dept: PSYCHIATRY | Facility: CLINIC | Age: 29
End: 2024-03-26
Payer: COMMERCIAL

## 2024-04-12 ENCOUNTER — OFFICE VISIT (OUTPATIENT)
Dept: BARIATRICS | Facility: CLINIC | Age: 29
End: 2024-04-12
Payer: COMMERCIAL

## 2024-04-12 VITALS
SYSTOLIC BLOOD PRESSURE: 140 MMHG | DIASTOLIC BLOOD PRESSURE: 86 MMHG | BODY MASS INDEX: 41.95 KG/M2 | HEIGHT: 70 IN | HEART RATE: 65 BPM | TEMPERATURE: 97 F | RESPIRATION RATE: 16 BRPM | WEIGHT: 293 LBS

## 2024-04-12 DIAGNOSIS — G47.33 OSA (OBSTRUCTIVE SLEEP APNEA): ICD-10-CM

## 2024-04-12 DIAGNOSIS — I10 ESSENTIAL HYPERTENSION: ICD-10-CM

## 2024-04-12 DIAGNOSIS — Z98.84 S/P BARIATRIC SURGERY: ICD-10-CM

## 2024-04-12 DIAGNOSIS — E66.01 MORBID OBESITY: Primary | ICD-10-CM

## 2024-04-12 PROCEDURE — 99213 OFFICE O/P EST LOW 20 MIN: CPT | Mod: S$GLB,,, | Performed by: SURGERY

## 2024-04-12 PROCEDURE — 3077F SYST BP >= 140 MM HG: CPT | Mod: CPTII,S$GLB,, | Performed by: SURGERY

## 2024-04-12 PROCEDURE — 99999 PR PBB SHADOW E&M-EST. PATIENT-LVL III: CPT | Mod: PBBFAC,,, | Performed by: SURGERY

## 2024-04-12 PROCEDURE — 3008F BODY MASS INDEX DOCD: CPT | Mod: CPTII,S$GLB,, | Performed by: SURGERY

## 2024-04-12 PROCEDURE — 3079F DIAST BP 80-89 MM HG: CPT | Mod: CPTII,S$GLB,, | Performed by: SURGERY

## 2024-04-12 PROCEDURE — 4010F ACE/ARB THERAPY RXD/TAKEN: CPT | Mod: CPTII,S$GLB,, | Performed by: SURGERY

## 2024-04-12 PROCEDURE — 1159F MED LIST DOCD IN RCRD: CPT | Mod: CPTII,S$GLB,, | Performed by: SURGERY

## 2024-04-12 NOTE — PROGRESS NOTES
Post Op Note    Surgery: gastric sleeve surgery  Date: 12/20/23  Initial weight: 506  Last weight: 451  Current weight: 435    Constipation: none  Reflux: none  Vomiting: none    Diet:    Chicken   Fish   Some vegetables  Protein shake    Exercise:  Working out at gym    Doing about 3 - 4 days per week    MVI: taurus mvi, calcium     Vitals:    04/12/24 1321   BP: (!) 140/86   Pulse: 65   Resp: 16   Temp: 97.4 °F (36.3 °C)       Body comp:  Fat Percent:  55.9 %  Fat Mass:  243.4 lb  FFM:  192 lb  TBW: 147.4 lb  TBW %:  33.9 %  BMR: 2944 kcal    PE:  NAD  RRR  Soft/nt/nd    Labs: reviewed    A/P: s/p sleeve     Counseling for patient:    Diet: continue low carb dieting  Exercise: as much as possible, no limitations  Vitamins: continue routine    Co morbidities:     1. Morbid obesity  CBC w/ Auto Differential    CMP    B12    B1    Lipid Panel    Iron Studies    Vitamin D 25 Hydroxy      2. S/P bariatric surgery        3. Essential hypertension        4. NAJMA (obstructive sleep apnea)            -All above: Evaluated, monitored, and treated with diet and exercise program.

## 2024-05-22 ENCOUNTER — PATIENT MESSAGE (OUTPATIENT)
Dept: BARIATRICS | Facility: CLINIC | Age: 29
End: 2024-05-22
Payer: COMMERCIAL

## 2024-05-27 ENCOUNTER — TELEPHONE (OUTPATIENT)
Dept: PSYCHIATRY | Facility: CLINIC | Age: 29
End: 2024-05-27
Payer: COMMERCIAL

## 2024-05-27 NOTE — TELEPHONE ENCOUNTER
Patient called at 7:09 am to cancel appointment due to sore throat. Would like call back to reschedule.

## 2024-06-18 ENCOUNTER — PATIENT MESSAGE (OUTPATIENT)
Dept: BARIATRICS | Facility: CLINIC | Age: 29
End: 2024-06-18
Payer: COMMERCIAL

## 2024-07-03 ENCOUNTER — OFFICE VISIT (OUTPATIENT)
Dept: PSYCHIATRY | Facility: CLINIC | Age: 29
End: 2024-07-03
Payer: COMMERCIAL

## 2024-07-03 DIAGNOSIS — E66.01 MORBID OBESITY: ICD-10-CM

## 2024-07-03 DIAGNOSIS — I10 ESSENTIAL HYPERTENSION: ICD-10-CM

## 2024-07-03 DIAGNOSIS — F41.8 OTHER SPECIFIED ANXIETY DISORDERS: Primary | ICD-10-CM

## 2024-07-03 PROCEDURE — 90832 PSYTX W PT 30 MINUTES: CPT | Mod: S$GLB,,, | Performed by: PSYCHOLOGIST

## 2024-07-03 PROCEDURE — 99999 PR PBB SHADOW E&M-EST. PATIENT-LVL I: CPT | Mod: PBBFAC,,, | Performed by: PSYCHOLOGIST

## 2024-07-03 PROCEDURE — 4010F ACE/ARB THERAPY RXD/TAKEN: CPT | Mod: CPTII,S$GLB,, | Performed by: PSYCHOLOGIST

## 2024-07-03 NOTE — PROGRESS NOTES
Individual Psychotherapy/Bariatric Surgery 6-month Follow-up     DATE: 07/03/2024     Site:  [facility]          Therapeutic Intervention: Met with patient.  Outpatient - Supportive psychotherapy 30 min - CPT Code 62206     Chief complaint/reason for encounter: weight management; stress management; relationship stress     Interval history and content of current session: Pt arrived on time to 6-month follow-up after bariatric eval. Discussed pt's progress with weight loss, and she reported loss of over 70 pounds since surgery. She reported no difficulties with dietary and lifestyle changes post-surgery. Discussed potential risks of self-esteem and relational changes post bariatric-surgery, and she reported no negative or unwanted changes. She reported positive changes in self-esteem, increased energy, elevated mood, and a strong focus on improving her life. Discussed her life stressors, and she noted work is an ongoing stressor, and she occasionally stress eats. She reported doubling her Lexapro dosage recently with prescribing physician. Discussed other coping skills and provided Model of Emotions handout. She was open to psychotherapy referral. Referral placed.     Treatment plan:  Target symptoms: weight management, stress  Why chosen therapy is appropriate versus another modality: relevant to diagnosis  Outcome monitoring methods: self-report  Therapeutic intervention type: supportive psychotherapy     Risk parameters:  Patient reports no suicidal ideation  Patient reports no homicidal ideation  Patient reports no self-injurious behavior  Patient reports no violent behavior     Verbal deficits: None     Patient's response to intervention:  The patient's response to intervention is accepting.     Progress toward goals and other mental status changes:  The patient's progress toward goals is good.     Diagnosis:   Anxiety, other specified     Plan:  Continue following up with bariatric team. Engage in  psychotherapy.

## 2024-07-08 ENCOUNTER — PATIENT MESSAGE (OUTPATIENT)
Dept: BARIATRICS | Facility: CLINIC | Age: 29
End: 2024-07-08
Payer: COMMERCIAL

## 2024-07-08 ENCOUNTER — PATIENT MESSAGE (OUTPATIENT)
Dept: PSYCHIATRY | Facility: CLINIC | Age: 29
End: 2024-07-08
Payer: COMMERCIAL

## 2024-07-18 ENCOUNTER — OFFICE VISIT (OUTPATIENT)
Dept: BARIATRICS | Facility: CLINIC | Age: 29
End: 2024-07-18
Payer: COMMERCIAL

## 2024-07-18 VITALS
RESPIRATION RATE: 16 BRPM | DIASTOLIC BLOOD PRESSURE: 76 MMHG | TEMPERATURE: 98 F | BODY MASS INDEX: 41.95 KG/M2 | SYSTOLIC BLOOD PRESSURE: 143 MMHG | WEIGHT: 293 LBS | HEART RATE: 85 BPM | HEIGHT: 70 IN

## 2024-07-18 DIAGNOSIS — E66.01 MORBID OBESITY: Primary | ICD-10-CM

## 2024-07-18 DIAGNOSIS — Z90.3 H/O GASTRIC SLEEVE: ICD-10-CM

## 2024-07-18 DIAGNOSIS — G47.33 OSA (OBSTRUCTIVE SLEEP APNEA): ICD-10-CM

## 2024-07-18 PROCEDURE — 1159F MED LIST DOCD IN RCRD: CPT | Mod: CPTII,S$GLB,, | Performed by: NURSE PRACTITIONER

## 2024-07-18 PROCEDURE — 4010F ACE/ARB THERAPY RXD/TAKEN: CPT | Mod: CPTII,S$GLB,, | Performed by: NURSE PRACTITIONER

## 2024-07-18 PROCEDURE — 99999 PR PBB SHADOW E&M-EST. PATIENT-LVL IV: CPT | Mod: PBBFAC,,, | Performed by: NURSE PRACTITIONER

## 2024-07-18 PROCEDURE — 1160F RVW MEDS BY RX/DR IN RCRD: CPT | Mod: CPTII,S$GLB,, | Performed by: NURSE PRACTITIONER

## 2024-07-18 PROCEDURE — 3008F BODY MASS INDEX DOCD: CPT | Mod: CPTII,S$GLB,, | Performed by: NURSE PRACTITIONER

## 2024-07-18 PROCEDURE — 3077F SYST BP >= 140 MM HG: CPT | Mod: CPTII,S$GLB,, | Performed by: NURSE PRACTITIONER

## 2024-07-18 PROCEDURE — 99213 OFFICE O/P EST LOW 20 MIN: CPT | Mod: S$GLB,,, | Performed by: NURSE PRACTITIONER

## 2024-07-18 PROCEDURE — 3078F DIAST BP <80 MM HG: CPT | Mod: CPTII,S$GLB,, | Performed by: NURSE PRACTITIONER

## 2024-07-18 NOTE — PROGRESS NOTES
Post Op Note    Surgery: gastric sleeve surgery  Date: 12/20/23  Initial weight: 506  Last weight: 435  Current weight: 429    Constipation: none  Reflux: none  Vomiting: none    Diet:  Breakfast: protein shake  Lunch: tuna pack,   Dinner: tuna pack or shrimp, chicken, broccoli   Water: 40 oz x2  ken at work, less when not working  Still  food/water  Usually  gm/day of protein    Exercise:  Working out at gym with 30 min cardio & weight machines- Doing about 3 - 4 days per week-- plan to get new   Walking when not at work 30 minutes  Usually 8-10K Steps while working      MVI:   taurus mvi,   calcium TID (1200 mg)    Vitals:    07/18/24 1117   BP: (!) 143/76   Pulse: 85   Resp: 16   Temp: 97.5 °F (36.4 °C)         Body comp:  Fat Percent:  56.6 %  Fat Mass:  243.2 lb  FFM:  186.4 lb  TBW: 143.2 lb  TBW %:  33.3 %  BMR: 2862 kcal    PE:  NAD  RRR  Soft/nt/nd    Labs: reviewed    A/P: s/p sleeve     Counseling for patient:  Continue prescribed medication  Tanita Body Composition Reviewed  No changes to fat  Decreased 6 lb of muscle  Decreased 3 lb of water  Diet: continue low carb dieting  Exercise: as much as possible, no limitations  Vitamins: continue routine  f/u to home sleep study  F/U with dietitian   F/U in 3 months    Co morbidities:     1. Morbid obesity  Home Sleep Study      2. BMI 60.0-69.9, adult        3. NAJMA (obstructive sleep apnea)  Home Sleep Study      4. H/O gastric sleeve  Home Sleep Study          -All above: Evaluated, monitored, and treated with diet and exercise program.

## 2024-08-05 ENCOUNTER — PATIENT MESSAGE (OUTPATIENT)
Dept: BARIATRICS | Facility: CLINIC | Age: 29
End: 2024-08-05
Payer: COMMERCIAL

## 2024-08-06 ENCOUNTER — CLINICAL SUPPORT (OUTPATIENT)
Dept: BARIATRICS | Facility: CLINIC | Age: 29
End: 2024-08-06
Payer: COMMERCIAL

## 2024-08-06 DIAGNOSIS — Z71.9 ENCOUNTER FOR HEALTH EDUCATION: Primary | ICD-10-CM

## 2024-08-07 ENCOUNTER — PATIENT MESSAGE (OUTPATIENT)
Dept: BARIATRICS | Facility: CLINIC | Age: 29
End: 2024-08-07
Payer: COMMERCIAL

## 2024-08-09 ENCOUNTER — CLINICAL SUPPORT (OUTPATIENT)
Dept: BARIATRICS | Facility: CLINIC | Age: 29
End: 2024-08-09
Payer: COMMERCIAL

## 2024-08-09 VITALS — BODY MASS INDEX: 41.95 KG/M2 | HEIGHT: 70 IN | WEIGHT: 293 LBS

## 2024-08-09 DIAGNOSIS — E66.01 MORBID OBESITY: ICD-10-CM

## 2024-08-09 DIAGNOSIS — Z71.3 ENCOUNTER FOR DIETARY COUNSELING AND SURVEILLANCE: Primary | ICD-10-CM

## 2024-08-09 PROCEDURE — 99999 PR PBB SHADOW E&M-EST. PATIENT-LVL III: CPT | Mod: PBBFAC,,,

## 2024-08-09 NOTE — PROGRESS NOTES
"NUTRITION NOTE    Referring Physician: Dr. Vaz  Reason for MNT Referral: Follow-up s/p Gastric Sleeve on 12/20/23    PAST MEDICAL HISTORY:  Denies nausea, vomiting, constipation, and diarrhea.  Reports doing well.    Past Medical History:   Diagnosis Date    Asthma     HTN (hypertension)     NAJMA (obstructive sleep apnea)     no CPAP       CLINICAL DATA:  28 y.o. female.    There were no vitals filed for this visit.    Current Weight: 431 lbs  BMI: 62.77  Total Weight Loss: -75 lbs    LABS:  Reviewed.    CURRENT DIET:  Bariatric "New" Normal Diet    Diet Recall: 60-64 grams of protein/day; 80-90 oz of fluids/day    Diet Includes:    Breakfast - protein shake (Premier) or 1-2 egg whites   Lunch - tuna packet with 4-6 whole grain crackers   Dinner- tuna packet with 4-6 whole grain crackers or 6-8 shrimp or 3oz. Grilled/baked/rotisserie chicken  Meal Pattern: 2 meal(s) + 0 snack(s) + 1 protein supplement(s)  Adequate protein supplement intake.  Adequate dairy intake.  Inadequate vegetable intake.  Inadequate fruit intake.  Starchy CHO: crackers  Beverages: 40oz ken x 2, SF juices, Seeq clear protein powder    EXERCISE:  Adequate light exercise. 8-10K steps/d at work. Decrease workout on off days.     Restrictions to Exercise: None.    VITAMINS/MINERALS:  Multivitamins: Bariatric pal  B-Complex: Included with multivitamin.  Calcium Citrate + Vitamin D: Celebrate chews TID (1200mg)  Vitamin B12:  Included with multivitamin    ASSESSMENT:  Doing fairly well overall.  Adequate protein intake.  Adequate fluid intake.  Advancing diet appropriately.  Not exercising.  Adequate vitamins & minerals.    BARIATRIC DIET DISCUSSION:  Instructed and provided written materials on bariatric new normal diet plan.  Discussed importance of 4-6 mini meals post-operatively due to new stomach size.  Encouraged more caloric intake with real food sources of protein to meet nutrient needs.   Reinforced post-op nutrition " guidelines.  Importance of creating a well balanced plate that focuses on lean protein, fiber, and healthy fats.   Reviewed portion sizes associated with post-operative status.   Stressed importance of exercise role within weight management.   Reviewed guidelines for alcohol intake and physiological changes of alcohol metabolism.  Answered all questions.    PLAN / RECOMMENDATIONS:  Continue bariatric new normal diet.  Start consuming 4-6 small meals/d to improve overall oral intake to meet nutrient needs.   Maintain protein intake.  Begin incorporating more real food sources of protein such as chicken, beef, shrimp.   Maintain fluid intake.  Addition of vegetables to meals.   Begin light exercise.  Continue appropriate vitamins & minerals.  Adjust vitamins & minerals by can decrease 1 calcium chew when drinking protein shake. 1200mg calcium citrate can be met with dietary and supplementation intake.    Return to clinic in 2 months.    SESSION TIME: 60 minutes

## 2024-08-12 ENCOUNTER — PATIENT MESSAGE (OUTPATIENT)
Dept: BARIATRICS | Facility: CLINIC | Age: 29
End: 2024-08-12
Payer: COMMERCIAL

## 2024-08-12 NOTE — PATIENT INSTRUCTIONS
Goals:  Start consuming 4-6 small meals/d to improve overall oral intake to meet nutrient needs.   Begin incorporating more real food sources of protein such as chicken, beef, shrimp.   Addition of vegetables to meals.   Begin light exercise. Create SMART goals when receive school schedule.   Calcium can decrease by 1 calcium chew when drinking protein shake. 1200mg calcium citrate can be met with dietary and supplementation intake.

## 2024-08-23 ENCOUNTER — OFFICE VISIT (OUTPATIENT)
Dept: BARIATRICS | Facility: CLINIC | Age: 29
End: 2024-08-23
Payer: COMMERCIAL

## 2024-08-23 VITALS
HEART RATE: 79 BPM | SYSTOLIC BLOOD PRESSURE: 137 MMHG | WEIGHT: 293 LBS | RESPIRATION RATE: 16 BRPM | BODY MASS INDEX: 41.95 KG/M2 | HEIGHT: 70 IN | TEMPERATURE: 98 F | DIASTOLIC BLOOD PRESSURE: 85 MMHG

## 2024-08-23 DIAGNOSIS — E66.01 MORBID OBESITY: Primary | ICD-10-CM

## 2024-08-23 DIAGNOSIS — G47.33 OSA (OBSTRUCTIVE SLEEP APNEA): ICD-10-CM

## 2024-08-23 PROCEDURE — 99999 PR PBB SHADOW E&M-EST. PATIENT-LVL III: CPT | Mod: PBBFAC,,, | Performed by: SURGERY

## 2024-08-23 NOTE — PROGRESS NOTES
Post Op Note    Surgery: gastric sleeve surgery  Date: 12/20/23  Initial weight: 506  Last weight: 435  Current weight: 421    Constipation: none  Reflux: none  Vomiting: none    Diet:    Stick to plan, small portions    Exercise:  As much as possible    MVI: daily mvi, calcium     Vitals:    08/23/24 0954   BP: 137/85   Pulse: 79   Resp: 16   Temp: 97.7 °F (36.5 °C)       Body comp:  Fat Percent:  58.5 %  Fat Mass:  246.6 lb  FFM:  175 lb    PE:  NAD  RRR  Soft/nt/nd    Labs: reviewed    A/P: s/p sleeve     Counseling for patient:    Diet: continue low carb dieting  Exercise: as much as possible   Vitamins: stick to regimen    Co morbidities:     1. Morbid obesity  CBC w/ Auto Differential    CMP    B12    B1    Lipid Panel    Iron Studies    Vitamin D 25 Hydroxy      2. BMI 60.0-69.9, adult        3. NAJMA (obstructive sleep apnea)            -All above: Evaluated, monitored, and treated with diet and exercise program.

## 2024-10-10 ENCOUNTER — OFFICE VISIT (OUTPATIENT)
Dept: PSYCHIATRY | Facility: CLINIC | Age: 29
End: 2024-10-10
Payer: COMMERCIAL

## 2024-10-10 DIAGNOSIS — F41.9 ANXIETY DISORDER, UNSPECIFIED TYPE: Primary | ICD-10-CM

## 2024-10-10 DIAGNOSIS — E66.01 MORBID OBESITY: ICD-10-CM

## 2024-10-10 PROCEDURE — 99999 PR PBB SHADOW E&M-EST. PATIENT-LVL I: CPT | Mod: PBBFAC,,,

## 2024-10-10 PROCEDURE — 90791 PSYCH DIAGNOSTIC EVALUATION: CPT | Mod: S$GLB,,,

## 2024-10-10 PROCEDURE — 4010F ACE/ARB THERAPY RXD/TAKEN: CPT | Mod: CPTII,S$GLB,,

## 2024-10-10 NOTE — PROGRESS NOTES
Primary Care Behavioral Health Integration: Initial  Date:  10/10/2024  Referral Source:  Nic Hill, PhD  Length of Appointment: 22 minutes spent face to face    Chief Complaint/Reason for Encounter:  Occupational Stress    History of Present Illness: Juanita Del Castillo, a 28 y.o. female referred by Nic Hill PhD  Patient was seen, examined and chart was reviewed. Met with patient. Patient shares she met with Dr. Hill for a 6month post-bariatric surgery follow up. At that time, patient notes she had a fallout with a friend and was under a lot of stress with school and work. At this time, patient notes many of these stressors have resolved. Her only current stressor is school, which she finds to be normal stress. Patient does report that she stress eats. She has lost almost 100lbs since surgery. She continues to work out and is in the process of finding a . Patient notes a good support network of friends and family. No immediate concerns noted at this time.    Past Medical History:   Diagnosis Date    Asthma     HTN (hypertension)     NAJMA (obstructive sleep apnea)     no CPAP         Current Outpatient Medications:     albuterol (PROVENTIL/VENTOLIN HFA) 90 mcg/actuation inhaler, Inhale 2 puffs into the lungs every 4 (four) hours as needed for Shortness of Breath. Rescue, Disp: 18 g, Rfl: 0    azithromycin (ZITHROMAX Z-EVETTE) 250 MG tablet, Take as directed on package, Disp: 6 tablet, Rfl: 0    benzonatate (TESSALON) 200 MG capsule, Take 1 capsule (200 mg total) by mouth 3 (three) times daily as needed for Cough., Disp: 30 capsule, Rfl: 0    busPIRone (BUSPAR) 10 MG tablet, Take 1 tablet (10 mg total) by mouth 3 (three) times daily as needed (anxiety)., Disp: 30 tablet, Rfl: 2    docusate sodium (COLACE) 50 MG capsule, Take 100 mg by mouth once daily., Disp: , Rfl:     ergocalciferol (VITAMIN D2) 50,000 unit Cap, Take 1 capsule (50,000 Units total) by mouth once a week., Disp: 12  capsule, Rfl: 1    EScitalopram oxalate (LEXAPRO) 20 MG tablet, Take 1 tablet (20 mg total) by mouth once daily., Disp: 90 tablet, Rfl: 0    losartan (COZAAR) 50 MG tablet, Take 1 tablet (50 mg total) by mouth once daily. Needs office visit for further refills, Disp: 90 tablet, Rfl: 1    multivitamin (ONE DAILY MULTIVITAMIN) per tablet, Take 1 tablet by mouth once daily., Disp: , Rfl:     predniSONE (DELTASONE) 20 MG tablet, Take 2 tablets by mouth once daily for 5 days, Disp: 10 tablet, Rfl: 0    promethazine (PHENERGAN) 6.25 mg/5 mL syrup, Take 5 mLs (6.25 mg total) by mouth nightly as needed., Disp: 75 mL, Rfl: 0    Current symptoms:  Depression Symptoms: dysphoric mood and increased appetite.  Anxiety Symptoms: excessive worrying.  Sleep Difficulties: Patient denies sleep difficulty at this time.  Manic Symptoms:  denies.  Psychosis: denies .    Risk assessment:  Patient reports no suicidal ideation  Patient reports no homicidal ideation  Patient reports no self-injurious behavior  Patient reports no violent behavior    Patient advised to call [phone] or present the the nearest ED if they experience suicidal or homicidal ideation, plan or intent.      Psychiatric History:  Diagnosis: denies   Current Psychiatric Medication: Yes -  Pt is taking buspirone (Buspar) 10mg TID PRN for Anxiety. Pt is taking Lexapro 20mg or Depression. They are not interested in medication changes.   Medication Trial History:  Medication Trials: No    Outpatient Treatment: Yes - pre-surgery evaluation by Dr. Nic Hill   Inpatient Treatment: No   Suicide Attempts: No   Access to Firearms: No   History of Trauma: denies   Family Psychiatric History: debues     Current and Past Substance Use:  Alcohol: Social alcohol use. 1-2x a month   Drugs: Denied.   Nicotine: denied   Caffeine:  1 cup of coffee every 3 weeks     Mental Status Exam  General Appearance:  appears stated age, neatly dressed, well groomed   Speech: normal tone,  normal rate, normal pitch, normal volume      Level of Cooperation: cooperative      Thought Processes: linear, logical, goal-directed   Mood: euthymic      Thought Content: {relevant and appropriate   Affect: congruent and appropriate   Orientation: Oriented x4   Memory/Attention/Concentration: No gross cognitive deficits made evident during conversation   Judgment & Insight: good   Language  intact             10/10/2024    11:28 AM   GAD7   1. Feeling nervous, anxious, or on edge? 1   2. Not being able to stop or control worrying? 1   3. Worrying too much about different things? 1   4. Trouble relaxing? 1   5. Being so restless that it is hard to sit still? 0   6. Becoming easily annoyed or irritable? 1   7. Feeling afraid as if something awful might happen? 1   8. If you checked off any problems, how difficult have these problems made it for you to do your work, take care of things at home, or get along with other people? 0   LAWRENCE-7 Score 6           10/10/2024   PHQ-9 Depression Patient Health Questionnaire   Over the last two weeks how often have you been bothered by little interest or pleasure in doing things 1   Over the last two weeks how often have you been bothered by feeling down, depressed or hopeless 1   Over the last two weeks how often have you been bothered by trouble falling or staying asleep, or sleeping too much 0   Over the last two weeks how often have you been bothered by feeling tired or having little energy 0   Over the last two weeks how often have you been bothered by a poor appetite or overeating 1   Over the last two weeks how often have you been bothered by feeling bad about yourself - or that you are a failure or have let yourself or your family down 1   Over the last two weeks how often have you been bothered by trouble concentrating on things, such as reading the newspaper or watching television 0   Over the last two weeks how often have you been bothered by moving or speaking so  slowly that other people could have noticed. 0   Over the last two weeks how often have you been bothered by thoughts that you would be better off dead, or of hurting yourself 0   If you checked off any problems, how difficult have these problems made it for you to do your work, take care of things at home or get along with other people? Not difficult at all   PHQ-9 Score 4          Impression: Initial appointment focused on gathering history, identifying treatment goals and developing a treatment plan. Patient is unsure of what she would like to focus on at this time. Patient does endorse some difficulty with communicating and expressing her emotions. Patient was tasked with journaling for the next two weeks and identifying goals for treatment.     Diagnosis:  1. Anxiety disorder, unspecified type  Ambulatory referral/consult to Psychology      2. Morbid obesity  Ambulatory referral/consult to Psychology          Treatment Goals and Plan:   Treatment goals to be determined. Patient encouraged to journal for the next two weeks.   Goals will be established during future visit.    Future treatment will utilize CBT and Solution-focused Therapy.      Return to Clinic: as scheduled         Cierra Navarro PsyD

## 2024-10-18 ENCOUNTER — OFFICE VISIT (OUTPATIENT)
Dept: BARIATRICS | Facility: CLINIC | Age: 29
End: 2024-10-18
Payer: COMMERCIAL

## 2024-10-18 VITALS
HEART RATE: 73 BPM | BODY MASS INDEX: 41.95 KG/M2 | HEIGHT: 70 IN | WEIGHT: 293 LBS | TEMPERATURE: 97 F | SYSTOLIC BLOOD PRESSURE: 142 MMHG | DIASTOLIC BLOOD PRESSURE: 90 MMHG | RESPIRATION RATE: 16 BRPM

## 2024-10-18 DIAGNOSIS — E55.9 VITAMIN D INSUFFICIENCY: ICD-10-CM

## 2024-10-18 DIAGNOSIS — E66.01 MORBID OBESITY: Primary | ICD-10-CM

## 2024-10-18 DIAGNOSIS — Z90.3 H/O GASTRIC SLEEVE: ICD-10-CM

## 2024-10-18 DIAGNOSIS — R79.89 LOW VITAMIN D LEVEL: ICD-10-CM

## 2024-10-18 PROCEDURE — 99999 PR PBB SHADOW E&M-EST. PATIENT-LVL IV: CPT | Mod: PBBFAC,,, | Performed by: NURSE PRACTITIONER

## 2024-10-18 RX ORDER — ERGOCALCIFEROL 1.25 MG/1
50000 CAPSULE ORAL WEEKLY
Qty: 12 CAPSULE | Refills: 1 | Status: SHIPPED | OUTPATIENT
Start: 2024-10-18

## 2024-10-18 NOTE — PROGRESS NOTES
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      MVI:   joel mvi,   calcium citrate BID    Vitals:    10/18/24 1050   BP: (!) 142/90   Pulse: 73   Resp: 16   Temp: 97.3 °F (36.3 °C)       Body comp:  Fat Percent:  56.8%  Fat Mass:  246.0 lb  FFM:  187 lb  TBW: 143.6  TBW %: 33.2 %  BMR: 2875 patricio      PE:  NAD  RRR  Soft/nt/nd    Labs: reviewed    A/P: s/p sleeve     Counseling for patient:  Tanita scale reviewed  No change to fat  Increase 12 lb muscle  Diet: continue low carb dieting  Ensure 60 gm/protein per day  Clear, protein powder added to water, typically 20 gm protein- brands like Isopure Fusion, Oath, Seeq  Unflavored protein, Isopure, can add 25g to foods  No skipping meals  Discussed overnight oats-  Looked at [employer] bar  Exercise:   as much as possible   Add ankle weights while at work  Vitamins: stick to regimen  Labs reviewed-  noted Low Vit D,   Rx given for 46620 iu weekly,   take OTC Vit D 3000- 5000iu per day    Co morbidities:     1. Morbid obesity        2. BMI 60.0-69.9, adult        3. H/O gastric sleeve        4. Low vitamin D level        5. Vitamin D insufficiency  ergocalciferol (VITAMIN D2) 50,000 unit Cap          -All above: Evaluated, monitored, and treated with diet and exercise program.

## 2024-12-04 ENCOUNTER — OFFICE VISIT (OUTPATIENT)
Dept: OBSTETRICS AND GYNECOLOGY | Facility: CLINIC | Age: 29
End: 2024-12-04
Payer: COMMERCIAL

## 2024-12-04 VITALS — WEIGHT: 293 LBS | SYSTOLIC BLOOD PRESSURE: 146 MMHG | DIASTOLIC BLOOD PRESSURE: 86 MMHG | BODY MASS INDEX: 64.34 KG/M2

## 2024-12-04 DIAGNOSIS — T83.32XA INTRAUTERINE CONTRACEPTIVE DEVICE THREADS LOST, INITIAL ENCOUNTER: ICD-10-CM

## 2024-12-04 DIAGNOSIS — Z01.411 ENCOUNTER FOR GYNECOLOGICAL EXAMINATION WITH ABNORMAL FINDING: Primary | ICD-10-CM

## 2024-12-04 DIAGNOSIS — Z11.3 SCREENING EXAMINATION FOR STD (SEXUALLY TRANSMITTED DISEASE): ICD-10-CM

## 2024-12-04 DIAGNOSIS — Z30.431 IUD CHECK UP: ICD-10-CM

## 2024-12-04 PROCEDURE — 3008F BODY MASS INDEX DOCD: CPT | Mod: CPTII,S$GLB,, | Performed by: OBSTETRICS & GYNECOLOGY

## 2024-12-04 PROCEDURE — 0352U VAGINOSIS SCREEN BY DNA PROBE: CPT | Performed by: OBSTETRICS & GYNECOLOGY

## 2024-12-04 PROCEDURE — 1159F MED LIST DOCD IN RCRD: CPT | Mod: CPTII,S$GLB,, | Performed by: OBSTETRICS & GYNECOLOGY

## 2024-12-04 PROCEDURE — 4010F ACE/ARB THERAPY RXD/TAKEN: CPT | Mod: CPTII,S$GLB,, | Performed by: OBSTETRICS & GYNECOLOGY

## 2024-12-04 PROCEDURE — 3077F SYST BP >= 140 MM HG: CPT | Mod: CPTII,S$GLB,, | Performed by: OBSTETRICS & GYNECOLOGY

## 2024-12-04 PROCEDURE — 99395 PREV VISIT EST AGE 18-39: CPT | Mod: S$GLB,,, | Performed by: OBSTETRICS & GYNECOLOGY

## 2024-12-04 PROCEDURE — 3079F DIAST BP 80-89 MM HG: CPT | Mod: CPTII,S$GLB,, | Performed by: OBSTETRICS & GYNECOLOGY

## 2024-12-04 PROCEDURE — 87491 CHLMYD TRACH DNA AMP PROBE: CPT | Performed by: OBSTETRICS & GYNECOLOGY

## 2024-12-04 PROCEDURE — 99999 PR PBB SHADOW E&M-EST. PATIENT-LVL III: CPT | Mod: PBBFAC,,, | Performed by: OBSTETRICS & GYNECOLOGY

## 2024-12-04 NOTE — PROGRESS NOTES
Chief Complaint   Patient presents with    Annual Exam       History of Present Illness: Juanita Del Castillo is a 29 y.o. female that presents today 12/4/2024 with Patient's last menstrual period was 11/08/2024.  for well gyn visit.    Past Medical History:   Diagnosis Date    Asthma     HTN (hypertension)     NAJMA (obstructive sleep apnea)     no CPAP       Past Surgical History:   Procedure Laterality Date    LAPAROSCOPIC SLEEVE GASTRECTOMY N/A 12/20/2023    Procedure: GASTRECTOMY, SLEEVE, LAPAROSCOPIC;  Surgeon: Gabriel Vaz MD;  Location: [facility];  Service: General;  Laterality: N/A;    TONSILLECTOMY         Outpatient Medications Prior to Visit   Medication Sig Dispense Refill    albuterol (PROVENTIL/VENTOLIN HFA) 90 mcg/actuation inhaler Inhale 2 puffs into the lungs every 4 (four) hours as needed for Shortness of Breath. Rescue 18 g 0    busPIRone (BUSPAR) 10 MG tablet Take 1 tablet (10 mg total) by mouth 3 (three) times daily as needed (anxiety). 30 tablet 2    ergocalciferol (VITAMIN D2) 50,000 unit Cap Take 1 capsule (50,000 Units total) by mouth once a week. 12 capsule 1    EScitalopram oxalate (LEXAPRO) 20 MG tablet Take 1 tablet (20 mg total) by mouth once daily. 90 tablet 0    multivitamin (ONE DAILY MULTIVITAMIN) per tablet Take 1 tablet by mouth once daily.      docusate sodium (COLACE) 50 MG capsule Take 100 mg by mouth once daily. (Patient not taking: Reported on 12/4/2024)      losartan (COZAAR) 50 MG tablet Take 1 tablet (50 mg total) by mouth once daily. Needs office visit for further refills (Patient not taking: Reported on 12/4/2024) 90 tablet 1     No facility-administered medications prior to visit.       Review of patient's allergies indicates:   Allergen Reactions    Lisinopril      Lip swelling       Family History   Problem Relation Name Age of Onset    Hypertension Mother      Obesity Mother      Obesity Father      Obesity Paternal Aunt      Breast cancer Paternal Grandmother       Ovarian cancer Neg Hx         Social History     Socioeconomic History    Marital status: Single   Tobacco Use    Smoking status: Never    Smokeless tobacco: Never   Substance and Sexual Activity    Alcohol use: Yes     Comment: once every 3 months    Drug use: Never    Sexual activity: Yes     Partners: Male     Birth control/protection: I.U.D.     Social Drivers of Health     Financial Resource Strain: Low Risk  (2024)    Overall Financial Resource Strain (CARDIA)     Difficulty of Paying Living Expenses: Not hard at all   Food Insecurity: No Food Insecurity (2024)    Hunger Vital Sign     Worried About Running Out of Food in the Last Year: Never true     Ran Out of Food in the Last Year: Never true   Physical Activity: Sufficiently Active (2024)    Exercise Vital Sign     Days of Exercise per Week: 3 days     Minutes of Exercise per Session: 60 min   Stress: No Stress Concern Present (2024)    [employer] [place] [employer] - Occupational Stress Questionnaire     Feeling of Stress : Only a little   Housing Stability: Unknown (2024)    Housing Stability Vital Sign     Unable to Pay for Housing in the Last Year: No       OB History    Para Term  AB Living   0 0 0 0 0 0   SAB IAB Ectopic Multiple Live Births   0 0 0 0 0       Review of Symptoms:  GENERAL: Denies weight gain or weight loss. Feeling well overall.   SKIN: Denies rash or lesions.   HEAD: Denies head injury or headache.   NODES: Denies enlarged lymph nodes.   CHEST: Denies chest pain or shortness of breath.   CARDIOVASCULAR: Denies palpitations or left sided chest pain.   ABDOMEN: No abdominal pain, constipation, diarrhea, nausea, vomiting or rectal bleeding.   URINARY: No frequency, dysuria, hematuria, or burning on urination.  HEMATOLOGIC: No easy bruisability or excessive bleeding.   MUSCULOSKELETAL: Denies joint pain or swelling.     BP (!) 146/86   Wt (!) 200.5 kg (442 lb 0.4 oz)   LMP 2024    Physical Exam:  APPEARANCE: Well nourished, well developed, in no acute distress.  SKIN: Normal skin turgor, no lesions.  NECK: Neck symmetric without masses   RESPIRATORY: Normal respiratory effort with no retractions or use of accessory muscles  CARDIOVASCULAR: Peripheral vascular system with no swelling no varicosities and palpation of pulses normal  LYMPHATIC: No enlargements of the lymph nodes noted in the neck, axillae, or groin  ABDOMEN: Soft. No tenderness or masses. No hepatosplenomegaly. No hernias.  BREASTS: Symmetrical, no skin changes or visible lesions. No palpable masses, nipple discharge or adenopathy bilaterally.  PELVIC: Normal external female genitalia without lesions. Normal hair distribution. Adequate perineal body, normal urethral meatus. Urethra with no masses.  Bladder nontender. Vagina moist and well rugated without lesions or discharge. Cervix pink and without lesions. No significant cystocele or rectocele. Bimanual exam showed uterus normal size, shape, position, mobile and nontender. Adnexa without masses or tenderness. Urethra and bladder normal.   EXTREMITIES: No clubbing cyanosis or edema.    ASSESSMENT/PLAN:  Encounter for gynecological examination with abnormal finding  -     Liquid-Based Pap Smear, Screening  -     HPV High Risk Genotypes, PCR    IUD check up    Screening examination for STD (sexually transmitted disease)  -     C. trachomatis/N. gonorrhoeae by AMP DNA  -     Vaginosis Screen by DNA Probe; Future; Expected date: 12/04/2024    Intrauterine contraceptive device threads lost, initial encounter  -     US Pelvis Limited_IUD Placement or Check; Future; Expected date: 12/04/2024          Patient was counseled today on Pelvic exams and Pap Smear guidelines.   We discussed STD screening if at high risk for a STD.  We discussed recommendation for breast cancer screening with mammogram every other year after the age of 40 and annually after the age of 50.    We discussed  colon cancer screening when indicated.   Osteoporosis screening discussed when indicated.   She was advised to see her primary care physician for all other health maintenance.     FOLLOW-UP with me for next routine visit.

## 2024-12-23 ENCOUNTER — HOSPITAL ENCOUNTER (OUTPATIENT)
Dept: RADIOLOGY | Facility: HOSPITAL | Age: 29
Discharge: HOME | End: 2024-12-23
Attending: OBSTETRICS & GYNECOLOGY
Payer: COMMERCIAL

## 2024-12-23 DIAGNOSIS — T83.32XA INTRAUTERINE CONTRACEPTIVE DEVICE THREADS LOST, INITIAL ENCOUNTER: ICD-10-CM

## 2024-12-23 PROCEDURE — 76830 TRANSVAGINAL US NON-OB: CPT | Mod: 26,,, | Performed by: RADIOLOGY

## 2024-12-23 PROCEDURE — 76856 US EXAM PELVIC COMPLETE: CPT | Mod: TC,PN

## 2024-12-23 PROCEDURE — 76856 US EXAM PELVIC COMPLETE: CPT | Mod: 26,,, | Performed by: RADIOLOGY

## 2024-12-26 ENCOUNTER — PATIENT MESSAGE (OUTPATIENT)
Dept: OBSTETRICS AND GYNECOLOGY | Facility: CLINIC | Age: 29
End: 2024-12-26
Payer: COMMERCIAL

## 2024-12-26 DIAGNOSIS — T83.32XA INTRAUTERINE CONTRACEPTIVE DEVICE THREADS LOST, INITIAL ENCOUNTER: Primary | ICD-10-CM

## 2024-12-27 ENCOUNTER — TELEPHONE (OUTPATIENT)
Dept: OBSTETRICS AND GYNECOLOGY | Facility: CLINIC | Age: 29
End: 2024-12-27
Payer: COMMERCIAL

## 2024-12-27 ENCOUNTER — HOSPITAL ENCOUNTER (OUTPATIENT)
Dept: RADIOLOGY | Facility: HOSPITAL | Age: 29
Discharge: HOME | End: 2024-12-27
Attending: OBSTETRICS & GYNECOLOGY
Payer: COMMERCIAL

## 2024-12-27 DIAGNOSIS — T83.32XA INTRAUTERINE CONTRACEPTIVE DEVICE THREADS LOST, INITIAL ENCOUNTER: ICD-10-CM

## 2024-12-27 PROCEDURE — 74019 RADEX ABDOMEN 2 VIEWS: CPT | Mod: TC,FY,PO

## 2024-12-27 PROCEDURE — 74019 RADEX ABDOMEN 2 VIEWS: CPT | Mod: 26,,, | Performed by: RADIOLOGY

## 2024-12-27 NOTE — TELEPHONE ENCOUNTER
----- Message from Kelsea Evangelista MD sent at 12/27/2024 12:52 PM CST -----  Notify no IUD  Must have expelled. Use barrier for contraception

## 2025-01-07 ENCOUNTER — PATIENT MESSAGE (OUTPATIENT)
Dept: OBSTETRICS AND GYNECOLOGY | Facility: CLINIC | Age: 30
End: 2025-01-07
Payer: COMMERCIAL

## 2025-01-24 ENCOUNTER — OFFICE VISIT (OUTPATIENT)
Dept: BARIATRICS | Facility: CLINIC | Age: 30
End: 2025-01-24
Payer: COMMERCIAL

## 2025-01-24 VITALS
RESPIRATION RATE: 16 BRPM | BODY MASS INDEX: 41.95 KG/M2 | HEART RATE: 68 BPM | DIASTOLIC BLOOD PRESSURE: 90 MMHG | SYSTOLIC BLOOD PRESSURE: 179 MMHG | WEIGHT: 293 LBS | HEIGHT: 70 IN | TEMPERATURE: 98 F

## 2025-01-24 DIAGNOSIS — E66.01 MORBID OBESITY: ICD-10-CM

## 2025-01-24 DIAGNOSIS — R63.5 WEIGHT GAIN: Primary | ICD-10-CM

## 2025-01-24 DIAGNOSIS — Z90.3 H/O GASTRIC SLEEVE: ICD-10-CM

## 2025-01-24 PROCEDURE — 3080F DIAST BP >= 90 MM HG: CPT | Mod: CPTII,S$GLB,, | Performed by: NURSE PRACTITIONER

## 2025-01-24 PROCEDURE — 99213 OFFICE O/P EST LOW 20 MIN: CPT | Mod: S$GLB,,, | Performed by: NURSE PRACTITIONER

## 2025-01-24 PROCEDURE — 1159F MED LIST DOCD IN RCRD: CPT | Mod: CPTII,S$GLB,, | Performed by: NURSE PRACTITIONER

## 2025-01-24 PROCEDURE — 3077F SYST BP >= 140 MM HG: CPT | Mod: CPTII,S$GLB,, | Performed by: NURSE PRACTITIONER

## 2025-01-24 PROCEDURE — 1160F RVW MEDS BY RX/DR IN RCRD: CPT | Mod: CPTII,S$GLB,, | Performed by: NURSE PRACTITIONER

## 2025-01-24 PROCEDURE — 3008F BODY MASS INDEX DOCD: CPT | Mod: CPTII,S$GLB,, | Performed by: NURSE PRACTITIONER

## 2025-01-24 PROCEDURE — 99999 PR PBB SHADOW E&M-EST. PATIENT-LVL III: CPT | Mod: PBBFAC,,, | Performed by: NURSE PRACTITIONER

## 2025-01-24 NOTE — PROGRESS NOTES
Post Op Note    Surgery: gastric sleeve surgery  Date: 12/20/23  Initial weight: 506  Last weight: 433  Current weight: 436    Constipation: none  Reflux: none  Vomiting: none    Diet:  Breakfast: omelet with spinach, ham, cheese, bell peppers from cafeteria  Lunch: hamburger no bread  Snack: quest protein chips  Dinner: protein shake  Water: 64 oz with straw  Clear protein in water daily- 120 g/d protein  Limiting pasta  Stick to plan, small portions  Skipping meals continual problem, skip dinner when working    Exercise:  Steps at work 3-4 (12 hour) shift per week  Gym on off days   Stair stepper 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      MVI:   baripal mvi,   calcium citrate BID  Vitamin D Rx  Vitamin D Supplement-     Vitals:    01/24/25 1035   BP: (!) 179/90   Pulse: 68   Resp: 16   Temp: 97.5 °F (36.4 °C)         Body comp:  Fat Percent:  57.8%  Fat Mass:  252.2 lb  FFM:  184.2 lb  TBW:   TBW %:    BMR: 2842 patricio      PE:  NAD  RRR  Soft/nt/nd    Labs: reviewed    A/P: s/p sleeve     Counseling for patient:  Falls Reviewed  Continue with prescribed home medication  Tanita scale reviewed  Increased 6 lb fat  Decrease 3 lb muscle  Diet: continue low carb dieting  Ensure 60 gm/protein per day  Clear, protein powder added to water, typically 20 gm protein- brands like Isopure Fusion, Oath, Seeq  Unflavored protein, Isopure, can add 25g to foods  No skipping meals- continues to be the problem when working- add protein shake on drive to work  Exercise:   as much as possible   Add ankle weights while at work  Vitamins: stick to regimen  Labs reviewed October 2024  noted Low Vit D,   Rx given for 58063 iu weekly,   Continue OTC Vit D 3000- 5000 iu per day  Need Repeat Labs    Co morbidities:     1. Weight gain        2. Morbid obesity        3. BMI 60.0-69.9, adult        4. H/O gastric sleeve              -All above: Evaluated, monitored, and treated with diet and exercise program.

## 2025-02-27 ENCOUNTER — OFFICE VISIT (OUTPATIENT)
Dept: OBSTETRICS AND GYNECOLOGY | Facility: CLINIC | Age: 30
End: 2025-02-27
Payer: COMMERCIAL

## 2025-02-27 VITALS — DIASTOLIC BLOOD PRESSURE: 74 MMHG | BODY MASS INDEX: 64.79 KG/M2 | SYSTOLIC BLOOD PRESSURE: 114 MMHG | WEIGHT: 293 LBS

## 2025-02-27 DIAGNOSIS — Z11.51 SCREENING FOR HPV (HUMAN PAPILLOMAVIRUS): Primary | ICD-10-CM

## 2025-02-27 PROCEDURE — 99999 PR PBB SHADOW E&M-EST. PATIENT-LVL III: CPT | Mod: PBBFAC,,, | Performed by: OBSTETRICS & GYNECOLOGY

## 2025-02-27 PROCEDURE — 99499 UNLISTED E&M SERVICE: CPT | Mod: S$GLB,,, | Performed by: OBSTETRICS & GYNECOLOGY

## 2025-02-27 NOTE — PROGRESS NOTES
Chief Complaint   Patient presents with    Follow-up     Repeat pap today.       History of Present Illness: Juanita Del Castillo is a 29 y.o. female that presents today 2025 with LMP Patient's last menstrual period was 2025 (exact date). for   Chief Complaint   Patient presents with    Follow-up     Repeat pap today.         Past Medical History:   Diagnosis Date    Asthma     HTN (hypertension)     NAJMA (obstructive sleep apnea)     no CPAP       Past Surgical History:   Procedure Laterality Date    LAPAROSCOPIC SLEEVE GASTRECTOMY N/A 2023    Procedure: GASTRECTOMY, SLEEVE, LAPAROSCOPIC;  Surgeon: Gabriel Vaz MD;  Location: [facility];  Service: General;  Laterality: N/A;    TONSILLECTOMY         Medications Prior to Visit[1]    Review of patient's allergies indicates:   Allergen Reactions    Lisinopril      Lip swelling       Family History   Problem Relation Name Age of Onset    Breast cancer Paternal Grandmother      Obesity Father      Hypertension Mother      Obesity Mother      Obesity Paternal Aunt      Ovarian cancer Neg Hx      Diabetes Neg Hx      Uterine cancer Neg Hx      Cervical cancer Neg Hx         Social History[2]    OB History    Para Term  AB Living   0 0 0 0 0 0   SAB IAB Ectopic Multiple Live Births   0 0 0 0 0         /74 (BP Location: Right arm, Patient Position: Sitting)   Wt (!) 201.9 kg (445 lb 1.7 oz)   LMP 2025 (Exact Date)   Physical Exam:  APPEARANCE: Well nourished, well developed, in no acute distress.  SKIN: Normal skin turgor, no lesions.  NECK: Neck symmetric without masses   RESPIRATORY: Normal respiratory effort with no retractions or use of accessory muscles  CARDIOVASCULAR: Peripheral vascular system with no swelling no varicosities and palpation of pulses normal  LYMPHATIC: No enlargements of the lymph nodes noted in the neck, axillae, or groin  ABDOMEN: Soft. No tenderness or masses. No hepatosplenomegaly. No  hernias.  PELVIC: Normal external female genitalia without lesions. Normal hair distribution. Adequate perineal body, normal urethral meatus. Urethra with no masses.  Bladder nontender. Vagina moist and well rugated without lesions or discharge. Cervix pink and without lesions. No significant cystocele or rectocele. Bimanual exam showed uterus normal size, shape, position, mobile and nontender. Adnexa without masses or tenderness. Urethra and bladder normal.  EXTREMITIES: No clubbing cyanosis or edema.    ASSESSMENT/PLAN:  Screening for HPV (human papillomavirus)  -     HPV High Risk Genotypes, PCR                 [1]   Outpatient Medications Prior to Visit   Medication Sig Dispense Refill    albuterol (PROVENTIL/VENTOLIN HFA) 90 mcg/actuation inhaler Inhale 2 puffs into the lungs every 4 (four) hours as needed for Shortness of Breath. Rescue 18 g 0    busPIRone (BUSPAR) 10 MG tablet Take 1 tablet (10 mg total) by mouth 3 (three) times daily as needed (anxiety). 30 tablet 2    ergocalciferol (VITAMIN D2) 50,000 unit Cap Take 1 capsule (50,000 Units total) by mouth once a week. 12 capsule 1    EScitalopram oxalate (LEXAPRO) 20 MG tablet Take 1 tablet (20 mg total) by mouth once daily. 90 tablet 0    losartan (COZAAR) 50 MG tablet Take 1 tablet (50 mg total) by mouth once daily. Needs office visit for further refills 90 tablet 1    multivitamin (ONE DAILY MULTIVITAMIN) per tablet Take 1 tablet by mouth once daily.       No facility-administered medications prior to visit.   [2]   Social History  Tobacco Use    Smoking status: Never    Smokeless tobacco: Never   Substance Use Topics    Alcohol use: Yes     Comment: once every 3 months    Drug use: Never

## 2025-03-07 ENCOUNTER — RESULTS FOLLOW-UP (OUTPATIENT)
Dept: OBSTETRICS AND GYNECOLOGY | Facility: CLINIC | Age: 30
End: 2025-03-07

## 2025-03-07 ENCOUNTER — PATIENT MESSAGE (OUTPATIENT)
Dept: OBSTETRICS AND GYNECOLOGY | Facility: CLINIC | Age: 30
End: 2025-03-07
Payer: COMMERCIAL

## 2025-03-12 ENCOUNTER — PATIENT MESSAGE (OUTPATIENT)
Dept: OBSTETRICS AND GYNECOLOGY | Facility: CLINIC | Age: 30
End: 2025-03-12
Payer: COMMERCIAL

## 2025-03-27 ENCOUNTER — OFFICE VISIT (OUTPATIENT)
Dept: OBSTETRICS AND GYNECOLOGY | Facility: CLINIC | Age: 30
End: 2025-03-27
Payer: COMMERCIAL

## 2025-03-27 VITALS — BODY MASS INDEX: 44.41 KG/M2 | WEIGHT: 293 LBS | HEIGHT: 68 IN

## 2025-03-27 DIAGNOSIS — B97.7 HPV IN FEMALE: Primary | ICD-10-CM

## 2025-03-27 LAB
B-HCG UR QL: NEGATIVE
CTP QC/QA: YES

## 2025-03-27 PROCEDURE — 99499 UNLISTED E&M SERVICE: CPT | Mod: S$GLB,,, | Performed by: OBSTETRICS & GYNECOLOGY

## 2025-03-27 PROCEDURE — 81025 URINE PREGNANCY TEST: CPT | Mod: S$GLB,,, | Performed by: OBSTETRICS & GYNECOLOGY

## 2025-03-27 PROCEDURE — 99999 PR PBB SHADOW E&M-EST. PATIENT-LVL III: CPT | Mod: PBBFAC,,, | Performed by: OBSTETRICS & GYNECOLOGY

## 2025-03-27 PROCEDURE — 57455 BIOPSY OF CERVIX W/SCOPE: CPT | Mod: S$GLB,,, | Performed by: OBSTETRICS & GYNECOLOGY

## 2025-03-27 RX ORDER — DEXTROAMPHETAMINE SACCHARATE, AMPHETAMINE ASPARTATE MONOHYDRATE, DEXTROAMPHETAMINE SULFATE AND AMPHETAMINE SULFATE 2.5; 2.5; 2.5; 2.5 MG/1; MG/1; MG/1; MG/1
CAPSULE, EXTENDED RELEASE ORAL EVERY MORNING
COMMUNITY
Start: 2025-03-19

## 2025-03-27 NOTE — PROGRESS NOTES
TIMEOUT PERFORMED  Patient identified, procedure confirmed, and allergies reviewed.     COLPOSCOPY:    UPT: negative    Female patient presents for colposcopy.    PRE-COLPOSCOPY PROCEDURE COUNSELING:  Discussed the abnormal pap test findings, HPV, need for colposcopy and possible biopsies to determine a diagnosis and plan of care, treatments available, the minimal risks of bleeding and infection with a colposcopy, alternatives to colposcopy and she agrees to proceed.    COLPOSCOPY EXAM:   TIME OUT PERFORMED. The cervix was visualized with a speculum. Acetic acid was applied.  The entire tranformation zone could be adequately visualized.      White flat epithelium was present at 3,9,Location.  Mosaic pattern was not present.    Punctation was not present.    Abnormal vessels were not present.    Biopsy performed at 3,9 location.    ECC - Not done.    Specimens placed in formalin and sent to pathology. Monsel's solution was applied at biopsy sites to stop bleeding.    The speculum was removed.  The patient tolerated the procedure well.    IMPRESSION:   Abnormal Pap 795.09    Colposcopy Impression:  Satisfactory:  KAROLYN 1    POST COLPOSCOPY COUNSELING:   Manage post colposcopy cramping with NSAIDs, Tylenol or Rx per [employer].  Avoid anything in vagina (intercourse, douching, tampons) one week after the procedure.  Expect a clumpy blackish vaginal discharge (Monsel's solution) for several days.  Report bleeding heavier than a period, worsening pain, fever > 101.0 F, or foul-smelling vaginal discharge.  HPV vaccine recommended according to FDA age guidelines.  Importance of follow-up stressed.    Counseling lasted approximately 15 minutes and all her questions were answered.    FOLLOW-UP:   Based on biopsy results.

## 2025-03-31 ENCOUNTER — RESULTS FOLLOW-UP (OUTPATIENT)
Dept: OBSTETRICS AND GYNECOLOGY | Facility: CLINIC | Age: 30
End: 2025-03-31

## 2025-06-12 DIAGNOSIS — E55.9 VITAMIN D INSUFFICIENCY: ICD-10-CM

## 2025-06-13 RX ORDER — ERGOCALCIFEROL 1.25 MG/1
50000 CAPSULE ORAL
Qty: 12 CAPSULE | Refills: 1 | Status: SHIPPED | OUTPATIENT
Start: 2025-06-13

## 2025-07-25 ENCOUNTER — OFFICE VISIT (OUTPATIENT)
Dept: BARIATRICS | Facility: CLINIC | Age: 30
End: 2025-07-25
Payer: COMMERCIAL

## 2025-07-25 ENCOUNTER — PATIENT MESSAGE (OUTPATIENT)
Dept: BARIATRICS | Facility: CLINIC | Age: 30
End: 2025-07-25

## 2025-07-25 VITALS
SYSTOLIC BLOOD PRESSURE: 141 MMHG | HEIGHT: 70 IN | TEMPERATURE: 97 F | RESPIRATION RATE: 16 BRPM | DIASTOLIC BLOOD PRESSURE: 85 MMHG | BODY MASS INDEX: 41.95 KG/M2 | HEART RATE: 82 BPM | WEIGHT: 293 LBS

## 2025-07-25 DIAGNOSIS — Z90.3 H/O GASTRIC SLEEVE: ICD-10-CM

## 2025-07-25 DIAGNOSIS — Z71.3 ENCOUNTER FOR DIETARY COUNSELING AND SURVEILLANCE: ICD-10-CM

## 2025-07-25 DIAGNOSIS — E66.01 MORBID OBESITY: Primary | ICD-10-CM

## 2025-07-25 DIAGNOSIS — G47.33 OSA (OBSTRUCTIVE SLEEP APNEA): ICD-10-CM

## 2025-07-25 PROCEDURE — 99999 PR PBB SHADOW E&M-EST. PATIENT-LVL IV: CPT | Mod: PBBFAC,,, | Performed by: NURSE PRACTITIONER

## 2025-07-25 RX ORDER — SEMAGLUTIDE 0.25 MG/.5ML
0.25 INJECTION, SOLUTION SUBCUTANEOUS
Qty: 2 ML | Refills: 0 | Status: SHIPPED | OUTPATIENT
Start: 2025-07-25

## 2025-07-25 NOTE — PROGRESS NOTES
Post Op Note    Surgery: gastric sleeve surgery  Date: 12/20/23  Initial weight: 506  Last weight: 436  Current weight: 436    Constipation: none  Reflux: none  Vomiting: none    Diet:  Protein shake at 5 am  Breakfast: protein shake/smoothie (willie/strawberries) with protein powder- omelet with spinach, ham, cheese, bell peppers from cafeteria  Lunch: hamburger, lettuce and no bread  Snack: quest protein chips  Dinner: sald from [employer]  Snack: Built bars (14 carbs)  Water: 3 (40) oz with straw  Clear protein in water daily x2 - ~40 g/d protein  Limiting pasta  Stick to plan, small portions  Skipping meals occasionally (home now)  Tracking foods now    Exercise:  Steps at work 5 (12 hour) shift per week (while school is out)  Gym on off days   Stair stepper 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      MVI:   baripal mvi,   calcium citrate BID (calculating with food)  Vitamin D Rx  Vitamin D Supplement-     Vitals:    07/25/25 0926   BP: (!) 141/85   Pulse: 82   Resp: 16   Temp: 97.1 °F (36.2 °C)         Body comp:  Fat Percent:  59 %  Fat Mass:  257.6 lb  FFM:  179 lb  TBW:   TBW %:    BMR: 2776 patricio      PE:  NAD  RRR  Soft/nt/nd    Labs: reviewed    A/P: s/p sleeve     Counseling for patient:  Falls Reviewed  Continue with prescribed home medication  Tanita scale reviewed  Increased 6 lb fat  Decrease 3 lb muscle  Diet: continue low carb dieting  Ensure 60 gm/protein per day  Clear, protein powder added to water, typically 20 gm protein- brands like Isopure Fusion, Oath, Seeq  Unflavored protein, Isopure, can add 25g to foods  No skipping meals- continues to be the problem when working- add protein shake on drive to work  Exercise:   as much as possible   Add ankle weights while at work  Vitamins: stick to regimen  Labs reviewed October 2024  noted Low Vit D,   Rx given for 71260 iu weekly,   Continue OTC Vit D 3000- 5000 iu per day  Need Repeat Labs    Changes for today, 7/25/25  Doing a great on  diet/measuring food/protein intake  Medical Weight Loss HX:  Past attempts at weight loss include:   Unsupervised: gym membership;    Supervised:  diet pills from MD, agnieszka, weight watchers;    Diet pills: ozempic until needed DM diagnosis; contrave d/t unable to tolerate GI symtpoms  Exercise attempts: walking or running, treadmill, group classes, weight training  Bariatric Surgery 12/20/2023 with 70 lb weight loss      PO vs Injectables  PO  Topamax (Topiramate)-   IR vs ER Amphetamines- Phentermine, Phendimetrazine, Diethylpropion   Currently on Amphetamines for ADHD  Contrave (Bupropion/Naltrexone)-   contraindicated d/t previous use with inability to tolerate GI symptoms  Metformin (Glucophage)  Injectables  Discussed Wegovy vs Zepbound  Discussed muscle vs fat loss on GLP1/GIP  Will attempt Medical Weight loss with Wegovy with Obesity and Hx of Bariatric Surgery   Aware that will need prior authorization for medication, which can take some time  Aware medication not formulary at primary pharmacy and might have to change pharmacy if they do not carry it   Denies history or family history of Medullary Thyroid Cancer or Multiple endocrine neoplasia syndrome  Denies history of pancreatitis  Will start dosage at 0.25 mg subcutaneous for 4 weeks (weekly injections) Can continue to increase every 4 weeks with max dose of 2.4 mg    Co morbidities:     1. Morbid obesity  semaglutide, weight loss, (WEGOVY) 0.25 mg/0.5 mL PnIj      2. BMI 60.0-69.9, adult  semaglutide, weight loss, (WEGOVY) 0.25 mg/0.5 mL PnIj      3. H/O gastric sleeve  semaglutide, weight loss, (WEGOVY) 0.25 mg/0.5 mL PnIj      4. NAJMA (obstructive sleep apnea)        5. Encounter for dietary counseling and surveillance              -All above: Evaluated, monitored, and treated with diet and exercise program.

## 2025-08-20 ENCOUNTER — TELEPHONE (OUTPATIENT)
Dept: BARIATRICS | Facility: CLINIC | Age: 30
End: 2025-08-20
Payer: COMMERCIAL